# Patient Record
Sex: FEMALE | Race: WHITE | URBAN - METROPOLITAN AREA
[De-identification: names, ages, dates, MRNs, and addresses within clinical notes are randomized per-mention and may not be internally consistent; named-entity substitution may affect disease eponyms.]

---

## 2017-02-02 ENCOUNTER — TELEPHONE (OUTPATIENT)
Dept: FAMILY MEDICINE | Facility: CLINIC | Age: 37
End: 2017-02-02

## 2017-02-02 DIAGNOSIS — N80.50 ENDOMETRIOSIS OF INTESTINE: Primary | ICD-10-CM

## 2017-02-02 NOTE — TELEPHONE ENCOUNTER
Writer unsure if this would be a GI referral or OB/GYN.    Both referrals pended.    Routing to PCP.    Dr. Kang-Please review and advise.    Thank you!  RABIA IrizarryN, RN

## 2017-02-02 NOTE — TELEPHONE ENCOUNTER
Writer called patient and reviewed message per below from Dr. Kang.    Patient verbalized understanding and in agreement with plan.    Patient would like [employer] message sent to her with referral information.    [employer] message sent to patient.    RABIA IrizarryN, RN

## 2017-02-02 NOTE — TELEPHONE ENCOUNTER
Reason for Call: Request for an order or referral:    Order or referral being requested: REFERRAL    Date needed: as soon as possible    Has the patient been seen by the PCP for this problem? YES    Additional comments: PT WANTS TO SEE A SPECIALIST FOR ENDOMETRIOSIS OF THE BOWELS    Phone number Patient can be reached at:  Home number on file [phone] (home)    Best Time:  ANY TIME    Can we leave a detailed message on this number?  YES    Call taken on 2/2/2017 at 2:50 PM by Sanjana Bryson

## 2017-02-08 ENCOUNTER — OFFICE VISIT (OUTPATIENT)
Dept: OBGYN | Facility: CLINIC | Age: 37
End: 2017-02-08
Attending: OBSTETRICS & GYNECOLOGY
Payer: COMMERCIAL

## 2017-02-08 VITALS
BODY MASS INDEX: 26.47 KG/M2 | DIASTOLIC BLOOD PRESSURE: 75 MMHG | SYSTOLIC BLOOD PRESSURE: 128 MMHG | HEART RATE: 66 BPM | HEIGHT: 69 IN | WEIGHT: 178.7 LBS

## 2017-02-08 DIAGNOSIS — Z79.890 SURGICAL MENOPAUSE ON HORMONE REPLACEMENT THERAPY: ICD-10-CM

## 2017-02-08 DIAGNOSIS — Z87.42 HISTORY OF ENDOMETRIOSIS: ICD-10-CM

## 2017-02-08 DIAGNOSIS — E89.40 SURGICAL MENOPAUSE ON HORMONE REPLACEMENT THERAPY: ICD-10-CM

## 2017-02-08 DIAGNOSIS — N80.9 ENDOMETRIOSIS: Primary | ICD-10-CM

## 2017-02-08 PROBLEM — L24.4 IRRITANT CONTACT DERMATITIS DUE TO DRUG IN CONTACT WITH SKIN: Status: ACTIVE | Noted: 2017-02-08

## 2017-02-08 PROCEDURE — 99212 OFFICE O/P EST SF 10 MIN: CPT | Mod: ZF

## 2017-02-08 RX ORDER — ESTRADIOL 0.07 MG/D
1 FILM, EXTENDED RELEASE TRANSDERMAL
Qty: 12 PATCH | Refills: 3 | Status: SHIPPED | OUTPATIENT
Start: 2017-02-09 | End: 2017-10-17

## 2017-02-08 RX ORDER — LETROZOLE 2.5 MG/1
2.5 TABLET, FILM COATED ORAL DAILY
Qty: 90 TABLET | Refills: 3 | Status: SHIPPED | OUTPATIENT
Start: 2017-02-08 | End: 2018-02-13

## 2017-02-08 ASSESSMENT — ENCOUNTER SYMPTOMS
BOWEL INCONTINENCE: 0
HEARTBURN: 0
BREAST PAIN: 1
NAUSEA: 0
ARTHRALGIAS: 0
MUSCLE CRAMPS: 0
CONSTIPATION: 1
DIARRHEA: 1
HOT FLASHES: 1
STIFFNESS: 0
ABDOMINAL PAIN: 1
VOMITING: 0
DECREASED LIBIDO: 1
BREAST MASS: 0
BACK PAIN: 1
RECTAL BLEEDING: 0
NECK PAIN: 1
BLOOD IN STOOL: 0
MUSCLE WEAKNESS: 0
JOINT SWELLING: 0
JAUNDICE: 0
MYALGIAS: 1
BLOATING: 1
RECTAL PAIN: 1

## 2017-02-08 ASSESSMENT — ANXIETY QUESTIONNAIRES
7. FEELING AFRAID AS IF SOMETHING AWFUL MIGHT HAPPEN: 0 = NOT AT ALL
GAD7 TOTAL SCORE: 2
GAD7 TOTAL SCORE: 2

## 2017-02-08 NOTE — Clinical Note
"2/8/2017       RE: Ranjana Kovacs  [address]  [place] [address]     Dear Colleague,    Thank you for referring your patient, Ranjana Kovacs, to [facility] Please see a copy of my visit note below.    35yo P0 s/p ROCKY BSO for endometriosis presents for discussion of continued GI symptoms.   States same symptoms she had with endometriosis before hyst. Cramping and diarrhea.    States she has skin irritation from generic patch.     Patient Active Problem List   Diagnosis     History of endometriosis s/p TAHBSO     Constipation, unspecified constipation type     Irritant contact dermatitis due to drug in contact with skin: generic estrogen patch     Vitamin D deficiency     Past Medical History   Diagnosis Date     Endometriosis      s/p ROCKY/ BSO 2012     History of anemia      on iron in the past      Constipation      History of high cholesterol      was on lipitor controlled on diet no longer on statin, tested in [facility] 2015 wnl      Anemia      long history of anemia, often take iron     Uncomplicated asthma      inflamatory asthma     Past Surgical History   Procedure Laterality Date     Hysteroscopy diagnostic  2011     Hysterectomy, pap no longer indicated  7/20/2012     total per records reviewed      Colonoscopy  4/27/16     diverticulosis, polyp removed,      Abdomen surgery       Genitourinary surgery       Obstetric History     No data available        /75 mmHg  Pulse 66  Ht 1.746 m (5' 8.75\")  Wt 81.058 kg (178 lb 11.2 oz)  BMI 26.59 kg/m2    Exam:  Constitutional: healthy, alert and no distress  Head: Normocephalic. No masses, lesions, tenderness or abnormalities  Neck:   ENT:   Cardiovascular:   Respiratory:   Gastrointestinal:   :   Musculoskeletal: extremities normal- no gross deformities noted, gait normal and normal muscle tone  Skin: no suspicious lesions or rashes  Neurologic: Gait " normal. Reflexes normal and symmetric. Sensation grossly WNL.  Psychiatric: mentation appears normal and affect normal/bright  Hematologic/Lymphatic/Immunologic:     Reviewed history including photos from surgery and pathology.   Patient states after ROCKY BSO she was started on HRT right away. We discussed this may not have been long enough to allow complete resolution of all endometriosis lesions.     Options: continue HRT and add letrozole for 3-6 months.   Stop HRT  Stop HRT and use letrozole.     After discussion patient elects trial of letrozole with HRT.     A/P:  Endometriosis: potentially incompletely treated: letrozole trial    Surgical menopause: continue HRT    Contact dermatitis: attempt to order brand name of Vivelle patch. May require authorization.   Naz Cummings

## 2017-02-08 NOTE — PROGRESS NOTES
"35yo P0 s/p ROCKY BSO for endometriosis presents for discussion of continued GI symptoms.   States same symptoms she had with endometriosis before hyst. Cramping and diarrhea.    States she has skin irritation from generic patch.     Patient Active Problem List   Diagnosis     History of endometriosis s/p TAHBSO     Constipation, unspecified constipation type     Irritant contact dermatitis due to drug in contact with skin: generic estrogen patch     Vitamin D deficiency     Past Medical History   Diagnosis Date     Endometriosis      s/p ROCKY/ BSO 2012     History of anemia      on iron in the past      Constipation      History of high cholesterol      was on lipitor controlled on diet no longer on statin, tested in [facility] 2015 wnl      Anemia      long history of anemia, often take iron     Uncomplicated asthma      inflamatory asthma     Past Surgical History   Procedure Laterality Date     Hysteroscopy diagnostic  2011     Hysterectomy, pap no longer indicated  7/20/2012     total per records reviewed      Colonoscopy  4/27/16     diverticulosis, polyp removed,      Abdomen surgery       Genitourinary surgery       Obstetric History     No data available        /75 mmHg  Pulse 66  Ht 1.746 m (5' 8.75\")  Wt 81.058 kg (178 lb 11.2 oz)  BMI 26.59 kg/m2    Exam:  Constitutional: healthy, alert and no distress  Head: Normocephalic. No masses, lesions, tenderness or abnormalities  Neck:   ENT:   Cardiovascular:   Respiratory:   Gastrointestinal:   :   Musculoskeletal: extremities normal- no gross deformities noted, gait normal and normal muscle tone  Skin: no suspicious lesions or rashes  Neurologic: Gait normal. Reflexes normal and symmetric. Sensation grossly WNL.  Psychiatric: mentation appears normal and affect normal/bright  Hematologic/Lymphatic/Immunologic:     Reviewed history including photos from surgery and pathology.   Patient states after ROCKY BSO she was started on HRT right away. We " discussed this may not have been long enough to allow complete resolution of all endometriosis lesions.     Options: continue HRT and add letrozole for 3-6 months.   Stop HRT  Stop HRT and use letrozole.     After discussion patient elects trial of letrozole with HRT.     A/P:  Endometriosis: potentially incompletely treated: letrozole trial    Surgical menopause: continue HRT    Contact dermatitis: attempt to order brand name of Vivelle patch. May require authorization.   Naz Cummings        Answers for HPI/ROS submitted by the patient on 2/8/2017   RAO 7 TOTAL SCORE: 2  PHQ-2 Depressed: Not at all, Not at all  PHQ-2 Score: 0  General Symptoms: No  Skin Symptoms: No  HENT Symptoms: No  EYE SYMPTOMS: No  HEART SYMPTOMS: No  LUNG SYMPTOMS: No  INTESTINAL SYMPTOMS: Yes  URINARY SYMPTOMS: No  GYNECOLOGIC SYMPTOMS: Yes  BREAST SYMPTOMS: Yes  SKELETAL SYMPTOMS: Yes  BLOOD SYMPTOMS: No  NERVOUS SYSTEM SYMPTOMS: No  MENTAL HEALTH SYMPTOMS: No  Heart burn or indigestion: No  Nausea: No  Vomiting: No  Abdominal pain: Yes  Bloating: Yes  Constipation: Yes  Diarrhea: Yes  Blood in stool: No  Black stools: No  Rectal or Anal pain: Yes  Fecal incontinence: No  Rectal bleeding: No  Yellowing of skin or eyes: No  Vomit with blood: No  Change in stools: Yes  Hemorrhoids: Yes  Back pain: Yes  Muscle aches: Yes  Neck pain: Yes  Swollen joints: No  Joint pain: No  Bone pain: No  Muscle cramps: No  Muscle weakness: No  Joint stiffness: No  Bone fracture: No  Bleeding or spotting between periods: No  Heavy or painful periods: No  Irregular periods: No  Vaginal discharge: No  Hot flashes: Yes  Vaginal dryness: No  Genital ulcers: No  Reduced libido: Yes  Painful intercourse: No  Difficulty with sexual arousal: Yes  Post-menopausal bleeding: No  Discharge: No  Lumps: No  Pain: Yes  Nipple retraction: No

## 2017-02-09 ASSESSMENT — ANXIETY QUESTIONNAIRES: GAD7 TOTAL SCORE: 2

## 2017-05-15 ENCOUNTER — MYC MEDICAL ADVICE (OUTPATIENT)
Dept: FAMILY MEDICINE | Facility: CLINIC | Age: 37
End: 2017-05-15

## 2017-05-15 DIAGNOSIS — Z87.42 HISTORY OF ENDOMETRIOSIS: ICD-10-CM

## 2017-05-15 DIAGNOSIS — Z79.890 SURGICAL MENOPAUSE ON HORMONE REPLACEMENT THERAPY: ICD-10-CM

## 2017-05-15 DIAGNOSIS — E89.40 SURGICAL MENOPAUSE ON HORMONE REPLACEMENT THERAPY: ICD-10-CM

## 2017-05-18 NOTE — TELEPHONE ENCOUNTER
The hormones were given by ob in feb . Please direct med refills to them thanks as they are managing her endometriosis.

## 2017-05-18 NOTE — TELEPHONE ENCOUNTER
Routing refill request to provider for review/approval because:  -Estradiol also on active medication list, defer to provider as writer unsure if safe for patient to take both progesterone and estradiol.  -Patient also overdue for annual office visit.    Writer responded as per below.    Dr. Kang-Please sign if agree.    Thank you!  RABIA IrizarryN, RN          progesterone (PROMETRIUM) 100 MG capsule      Last Written Prescription Date: 5/10/16  Last Fill Quantity: 30, # refills: 11  Last Office Visit with [facility] or [facility] prescribing provider: 3/15/16       BP Readings from Last 3 Encounters:   02/08/17 128/75   05/11/16 123/68   03/25/16 128/66     Date of last Breast Exam: Unknown per chart review

## 2017-06-06 ENCOUNTER — OFFICE VISIT (OUTPATIENT)
Dept: FAMILY MEDICINE | Facility: CLINIC | Age: 37
End: 2017-06-06
Payer: COMMERCIAL

## 2017-06-06 VITALS
OXYGEN SATURATION: 98 % | TEMPERATURE: 98.3 F | WEIGHT: 177.5 LBS | HEART RATE: 62 BPM | DIASTOLIC BLOOD PRESSURE: 74 MMHG | BODY MASS INDEX: 26.29 KG/M2 | SYSTOLIC BLOOD PRESSURE: 130 MMHG | RESPIRATION RATE: 16 BRPM | HEIGHT: 69 IN

## 2017-06-06 DIAGNOSIS — E55.9 VITAMIN D DEFICIENCY: ICD-10-CM

## 2017-06-06 DIAGNOSIS — M89.8X1 PAIN OF RIGHT SCAPULA: ICD-10-CM

## 2017-06-06 DIAGNOSIS — K58.1 IRRITABLE BOWEL SYNDROME WITH CONSTIPATION: ICD-10-CM

## 2017-06-06 DIAGNOSIS — M79.672 LEFT FOOT PAIN: ICD-10-CM

## 2017-06-06 DIAGNOSIS — Z88.0 PENICILLIN ALLERGY: ICD-10-CM

## 2017-06-06 DIAGNOSIS — K64.4 EXTERNAL HEMORRHOIDS: ICD-10-CM

## 2017-06-06 DIAGNOSIS — Z87.42 HISTORY OF ENDOMETRIOSIS: ICD-10-CM

## 2017-06-06 DIAGNOSIS — Z00.00 ROUTINE GENERAL MEDICAL EXAMINATION AT A HEALTH CARE FACILITY: Primary | ICD-10-CM

## 2017-06-06 DIAGNOSIS — Z13.6 ENCOUNTER FOR SCREENING FOR CARDIOVASCULAR DISORDERS: ICD-10-CM

## 2017-06-06 DIAGNOSIS — Z13.0 SCREENING, ANEMIA, DEFICIENCY, IRON: ICD-10-CM

## 2017-06-06 DIAGNOSIS — Z13.1 SCREENING FOR DIABETES MELLITUS: ICD-10-CM

## 2017-06-06 DIAGNOSIS — S46.811A STRAIN OF TRAPEZIUS MUSCLE, RIGHT, INITIAL ENCOUNTER: ICD-10-CM

## 2017-06-06 DIAGNOSIS — M54.2 NECK PAIN: ICD-10-CM

## 2017-06-06 DIAGNOSIS — Z23 NEED FOR TDAP VACCINATION: ICD-10-CM

## 2017-06-06 LAB
BASOPHILS # BLD AUTO: 0 10E9/L (ref 0–0.2)
BASOPHILS NFR BLD AUTO: 0.3 %
DIFFERENTIAL METHOD BLD: NORMAL
EOSINOPHIL # BLD AUTO: 0.1 10E9/L (ref 0–0.7)
EOSINOPHIL NFR BLD AUTO: 1.4 %
ERYTHROCYTE [DISTWIDTH] IN BLOOD BY AUTOMATED COUNT: 13 % (ref 10–15)
HCT VFR BLD AUTO: 40.3 % (ref 35–47)
HGB BLD-MCNC: 13.3 G/DL (ref 11.7–15.7)
LYMPHOCYTES # BLD AUTO: 1.8 10E9/L (ref 0.8–5.3)
LYMPHOCYTES NFR BLD AUTO: 28.3 %
MCH RBC QN AUTO: 31.1 PG (ref 26.5–33)
MCHC RBC AUTO-ENTMCNC: 33 G/DL (ref 31.5–36.5)
MCV RBC AUTO: 94 FL (ref 78–100)
MONOCYTES # BLD AUTO: 0.6 10E9/L (ref 0–1.3)
MONOCYTES NFR BLD AUTO: 9.3 %
NEUTROPHILS # BLD AUTO: 3.9 10E9/L (ref 1.6–8.3)
NEUTROPHILS NFR BLD AUTO: 60.7 %
PLATELET # BLD AUTO: 203 10E9/L (ref 150–450)
RBC # BLD AUTO: 4.27 10E12/L (ref 3.8–5.2)
WBC # BLD AUTO: 6.5 10E9/L (ref 4–11)

## 2017-06-06 PROCEDURE — 99395 PREV VISIT EST AGE 18-39: CPT | Mod: 25 | Performed by: FAMILY MEDICINE

## 2017-06-06 PROCEDURE — 80061 LIPID PANEL: CPT | Performed by: FAMILY MEDICINE

## 2017-06-06 PROCEDURE — 90471 IMMUNIZATION ADMIN: CPT | Performed by: FAMILY MEDICINE

## 2017-06-06 PROCEDURE — 36415 COLL VENOUS BLD VENIPUNCTURE: CPT | Performed by: FAMILY MEDICINE

## 2017-06-06 PROCEDURE — 80050 GENERAL HEALTH PANEL: CPT | Performed by: FAMILY MEDICINE

## 2017-06-06 PROCEDURE — 90715 TDAP VACCINE 7 YRS/> IM: CPT | Performed by: FAMILY MEDICINE

## 2017-06-06 PROCEDURE — 99213 OFFICE O/P EST LOW 20 MIN: CPT | Mod: 25 | Performed by: FAMILY MEDICINE

## 2017-06-06 PROCEDURE — 82306 VITAMIN D 25 HYDROXY: CPT | Performed by: FAMILY MEDICINE

## 2017-06-06 RX ORDER — CYCLOBENZAPRINE HCL 5 MG
5 TABLET ORAL AT BEDTIME
Qty: 14 TABLET | Refills: 0 | Status: SHIPPED | OUTPATIENT
Start: 2017-06-06 | End: 2017-06-20

## 2017-06-06 NOTE — NURSING NOTE
Screening Questionnaire for Pediatric Immunization     Is the child sick today?   No    Does the child have allergies to medications, food a vaccine component, or latex?   Yes    Has the child had a serious reaction to a vaccine in the past?   No    Has the child had a health problem with lung, heart, kidney or metabolic disease (e.g., diabetes), asthma, or a blood disorder?  Is he/she on long-term aspirin therapy?   No    If the child to be vaccinated is 2 through 4 years of age, has a healthcare provider told you that the child had wheezing or asthma in the  past 12 months?   No   If your child is a baby, have you ever been told he or she has had intussusception ?   No    Has the child, sibling or parent had a seizure, has the child had brain or other nervous system problems?   No    Does the child have cancer, leukemia, AIDS, or any immune system          problem?   No    In the past 3 months, has the child taken medications that affect the immune system such as prednisone, other steroids, or anticancer drugs; drugs for the treatment of rheumatoid arthritis, Crohn s disease, or psoriasis; or had radiation treatments?   No   In the past year, has the child received a transfusion of blood or blood products, or been given immune (gamma) globulin or an antiviral drug?   No    Is the child/teen pregnant or is there a chance that she could become         pregnant during the next month?   No    Has the child received any vaccinations in the past 4 weeks?   No      Immunization questionnaire was positive for at least one answer.  Notified Dr. Rosario Kang MD and Dr. Jorge Luis MD gave the go ahead to give the patient her vaccine Tdap.      [place] doesn't apply on this patient    [place] self[place]screening form given to patient.    Per orders of Dr. Kang, injection of Tdap  given by Shweta Cardoza. Patient instructed to remain in clinic for 20 minutes afterwards, and to report any adverse reaction to me  "immediately.    Screening performed by Shweta Cardoza on 6/6/2017 at 5:22 PM.    Chief Complaint   Patient presents with     Physical     Initial /74  Pulse 62  Temp 98.3  F (36.8  C) (Oral)  Resp 16  Ht 5' 8.75\" (1.746 m)  Wt 177 lb 8 oz (80.5 kg)  SpO2 98%  BMI 26.4 kg/m2 Estimated body mass index is 26.4 kg/(m^2) as calculated from the following:    Height as of this encounter: 5' 8.75\" (1.746 m).    Weight as of this encounter: 177 lb 8 oz (80.5 kg)..  BP completed using cuff size: kavin Haas MA   "

## 2017-06-06 NOTE — MR AVS SNAPSHOT
After Visit Summary   6/6/2017    Ranjana Kovacs    MRN: [id number]           Patient Information     Date Of Birth          1980        Visit Information        Provider Department      6/6/2017 4:00 PM Rosario Kang MD [facility]        Today's Diagnoses     Routine general medical examination at a health care facility    -  1    History of endometriosis s/p TAHBSO        Vitamin D deficiency        Irritable bowel syndrome with constipation        Screening, anemia, deficiency, iron        Screening for diabetes mellitus        Encounter for screening for cardiovascular disorders        Need for Tdap vaccination        External hemorrhoids        Pain of right scapula        Neck pain        Penicillin allergy        Strain of trapezius muscle, right, initial encounter        Left foot pain          Care Instructions    tdap today   Labs today   No need for xray  follo wup gyn for hormones, call their clinic for refills  Follo wup Gi as needed for constipation irritable bowel portion  Colorectal referral for hemorrhoids  Suspect right trapezius strain   No need for xrays or mri yet   ibuprofen 600 mg three times a day with food 5 days  Flexeril 5 mg bedtime 14 days   Can make you drowsy   Ice then heat to upper back and left foot   Referral made to ortho for shoulder ,   Can decide about Physical therapy after that   Soak foot in warm then ice water every 5 min for 20 min daily   See allergist regarding Penicillin allergy         Preventive Health Recommendations  Female Ages 26 - 39  Yearly exam:   See your health care provider every year in order to    Review health changes.     Discuss preventive care.      Review your medicines if you your doctor has prescribed any.    Until age 30: Get a Pap test every three years (more often if you have had an abnormal result).    After age 30: Talk to your doctor about whether you should have a Pap test every 3 years or have a Pap test  with HPV screening every 5 years.   You do not need a Pap test if your uterus was removed (hysterectomy) and you have not had cancer.  You should be tested each year for STDs (sexually transmitted diseases), if you're at risk.   Talk to your provider about how often to have your cholesterol checked.  If you are at risk for diabetes, you should have a diabetes test (fasting glucose).  Shots: Get a flu shot each year. Get a tetanus shot every 10 years.   Nutrition:     Eat at least 5 servings of fruits and vegetables each day.    Eat whole-grain bread, whole-wheat pasta and brown rice instead of white grains and rice.    Talk to your provider about Calcium and Vitamin D.     Lifestyle    Exercise at least 150 minutes a week (30 minutes a day, 5 days of the week). This will help you control your weight and prevent disease.    Limit alcohol to one drink per day.    No smoking.     Wear sunscreen to prevent skin cancer.    See your dentist every six months for an exam and cleaning.            Follow-ups after your visit        Additional Services     ALLERGY/ASTHMA ADULT REFERRAL       Your provider has referred you to: [facility] - [phone]  [email or web address]  FH[facility]: [facility] [place] [phone]   [email or web address]  [place] [phone]   [email or web address]    Please be aware that coverage of these services is subject to the terms and limitations of your health insurance plan.  Call member services at your health plan with any benefit or coverage questions.      Please bring the following with you to your appointment:    (1) Any X-Rays, CTs or MRIs which have been performed.  Contact the facility where they were done to arrange for  prior to your scheduled appointment.    (2) List of current medications  (3) This referral request   (4) Any documents/labs given to you for this referral            COLORECTAL  SURGERY REFERRAL       Your provider has referred you to: [facility]: [facility] [place] [phone]   [email or web address]  [facility]: [facility] [phone]   [email or web address]  [facility]: [facility] [phone]   [email or web address]  [place] [phone]   [email or web address]    Referral Reason(s): Hemorrhoids  Special Concerns: None  This referral is: Elective (week +)  It is OK to leave a message on patient's voicemail.    Please be aware that coverage of these services is subject to the terms and limitations of your health insurance plan.  Call member services at your health plan with any benefit or coverage questions.      Please bring the following with you to your appointment:    (1) Any X-Rays, CTs or MRIs which have been performed.  Contact the facility where they were done to arrange for  prior to your scheduled appointment.    (2) List of current medications  (3) This referral request   (4) Any documents/labs given to you for this referral            ORTHO  REFERRAL       [facility] is referring you to the Orthopedic  Services at [facility].       The  Representative will assist you in the coordination of your Orthopedic and Musculoskeletal Care as prescribed by your physician.    The  Representative will call you within 1 business day to help schedule your appointment, or you may contact the  Representative at:    All areas ~ [phone]     Type of Referral : Non Surgical  Spine: Cervical / Thoracic: Cervical / Thoracic Spine Surgeon        Timeframe requested: 1 - 2 days    Coverage of these services is subject to the terms and limitations of your health insurance plan.  Please call member services at your health plan with any  benefit or coverage questions.      If X-rays, CT or MRI's have been performed, please contact the facility where they were done to arrange for , prior to your scheduled appointment.  Please bring this referral request to your appointment and present it to your specialist.                  Your next 10 appointments already scheduled     Jun 22, 2017  3:30 PM CDT   Return Visit with Naz Cummings MD   [facility] ([facility])    [facility] [address]  [address]  [facility] [address] [phone]              Who to contact     If you have questions or need follow up information about today's clinic visit or your schedule please contact [facility] directly at [phone].  Normal or non-critical lab and imaging results will be communicated to you by MyChart, letter or phone within 4 business days after the clinic has received the results. If you do not hear from us within 7 days, please contact the clinic through MyChart or phone. If you have a critical or abnormal lab result, we will notify you by phone as soon as possible.  Submit refill requests through [employer] or call your pharmacy and they will forward the refill request to us. Please allow 3 business days for your refill to be completed.          Additional Information About Your Visit        [employer] Information     [employer] gives you secure access to your electronic health record. If you see a primary care provider, you can also send messages to your care team and make appointments. If you have questions, please call your primary care clinic.  If you do not have a primary care provider, please call [phone] and they will assist you.        Care EveryWhere ID     This is your Care EveryWhere ID. This could be used by other organizations to access your [place] medical records  [id number]        Your Vitals Were     Pulse Temperature Respirations Height Pulse  "Oximetry BMI (Body Mass Index)    62 98.3  F (36.8  C) (Oral) 16 5' 8.75\" (1.746 m) 98% 26.4 kg/m2       Blood Pressure from Last 3 Encounters:   06/06/17 130/74   02/08/17 128/75   05/11/16 123/68    Weight from Last 3 Encounters:   06/06/17 177 lb 8 oz (80.5 kg)   02/08/17 178 lb 11.2 oz (81.1 kg)   05/11/16 167 lb 14.4 oz (76.2 kg)              We Performed the Following     ALLERGY/ASTHMA ADULT REFERRAL     CBC with platelets differential     COLORECTAL SURGERY REFERRAL     Comprehensive metabolic panel     Lipid panel reflex to direct LDL     ORTHO  REFERRAL     TDAP VACCINE (ADACEL)     TSH with free T4 reflex     VACCINE ADMINISTRATION, INITIAL     Vitamin D Deficiency          Today's Medication Changes          These changes are accurate as of: 6/6/17  5:17 PM.  If you have any questions, ask your nurse or doctor.               Start taking these medicines.        Dose/Directions    cyclobenzaprine 5 MG tablet   Commonly known as:  FLEXERIL   Used for:  Pain of right scapula, Neck pain   Started by:  Rosario Kang MD        Dose:  5 mg   Take 1 tablet (5 mg) by mouth At Bedtime for 14 days   Quantity:  14 tablet   Refills:  0            Where to get your medicines      These medications were sent to [facility] [address] [place] [address] AT [address] [address]     Phone:  [phone]     cyclobenzaprine 5 MG tablet                Primary Care Provider Office Phone # Fax #    Rosario Kang [phone]       [facility] [address] [address]        Thank you!     Thank you for choosing [facility]  for your care. Our goal is always to provide you with excellent care. Hearing back from our patients is one way we can continue to improve our services. Please take a few minutes to complete the written survey that you may receive in the mail after your visit with us. Thank you!             Your " Updated Medication List - Protect others around you: Learn how to safely use, store and throw away your medicines at www.disposemymeds.org.          This list is accurate as of: 6/6/17  5:17 PM.  Always use your most recent med list.                   Brand Name Dispense Instructions for use    cyclobenzaprine 5 MG tablet    FLEXERIL    14 tablet    Take 1 tablet (5 mg) by mouth At Bedtime for 14 days       dicyclomine 10 MG capsule    BENTYL    80 capsule    Take 1-2 capsules (10-20 mg) by mouth 2 times daily as needed Do not use at same time as hyoscyamine       estradiol 0.075 MG/24HR BIW patch    VIVELLE-DOT    12 patch    Place 1 patch onto the skin twice a week       hyoscyamine 0.125 MG sublingual tablet    LEVSIN/SL    60 tablet    Place 1 tablet (0.125 mg) under the tongue every 4 hours as needed for cramping       letrozole 2.5 MG tablet    FEMARA    90 tablet    Take 1 tablet (2.5 mg) by mouth daily       polyethylene glycol Packet    MIRALAX/GLYCOLAX     Take 1 packet by mouth 2 times daily       progesterone 100 MG capsule    PROMETRIUM    30 capsule    Take 1 capsule (100 mg) by mouth daily       vitamin D3 2000 UNITS Caps

## 2017-06-06 NOTE — PATIENT INSTRUCTIONS
tdap today   Labs today   No need for xray  follo wup gyn for hormones, call their clinic for refills  Follo wup Gi as needed for constipation irritable bowel portion  Colorectal referral for hemorrhoids  Suspect right trapezius strain   No need for xrays or mri yet   ibuprofen 600 mg three times a day with food 5 days  Flexeril 5 mg bedtime 14 days   Can make you drowsy   Ice then heat to upper back and left foot   Referral made to ortho for shoulder ,   Can decide about Physical therapy after that   Soak foot in warm then ice water every 5 min for 20 min daily   See allergist regarding Penicillin allergy         Preventive Health Recommendations  Female Ages 26 - 39  Yearly exam:   See your health care provider every year in order to    Review health changes.     Discuss preventive care.      Review your medicines if you your doctor has prescribed any.    Until age 30: Get a Pap test every three years (more often if you have had an abnormal result).    After age 30: Talk to your doctor about whether you should have a Pap test every 3 years or have a Pap test with HPV screening every 5 years.   You do not need a Pap test if your uterus was removed (hysterectomy) and you have not had cancer.  You should be tested each year for STDs (sexually transmitted diseases), if you're at risk.   Talk to your provider about how often to have your cholesterol checked.  If you are at risk for diabetes, you should have a diabetes test (fasting glucose).  Shots: Get a flu shot each year. Get a tetanus shot every 10 years.   Nutrition:     Eat at least 5 servings of fruits and vegetables each day.    Eat whole-grain bread, whole-wheat pasta and brown rice instead of white grains and rice.    Talk to your provider about Calcium and Vitamin D.     Lifestyle    Exercise at least 150 minutes a week (30 minutes a day, 5 days of the week). This will help you control your weight and prevent disease.    Limit alcohol to one drink per  day.    No smoking.     Wear sunscreen to prevent skin cancer.    See your dentist every six months for an exam and cleaning.

## 2017-06-06 NOTE — PROGRESS NOTES
Keith Ms. Kovacs,  Your results came back and are within acceptable limits. -Normal red blood cell (hgb) levels, normal white blood cell count and normal platelet levels.. If you have any further concerns please do not hesitate to contact us by message, phone or making an appointment.  Have a good day   Dr Jorge Luis WARNER

## 2017-06-07 LAB
ALBUMIN SERPL-MCNC: 4 G/DL (ref 3.4–5)
ALP SERPL-CCNC: 91 U/L (ref 40–150)
ALT SERPL W P-5'-P-CCNC: 28 U/L (ref 0–50)
ANION GAP SERPL CALCULATED.3IONS-SCNC: 6 MMOL/L (ref 3–14)
AST SERPL W P-5'-P-CCNC: 23 U/L (ref 0–45)
BILIRUB SERPL-MCNC: 0.3 MG/DL (ref 0.2–1.3)
BUN SERPL-MCNC: 15 MG/DL (ref 7–30)
CALCIUM SERPL-MCNC: 8.8 MG/DL (ref 8.5–10.1)
CHLORIDE SERPL-SCNC: 109 MMOL/L (ref 94–109)
CHOLEST SERPL-MCNC: 213 MG/DL
CO2 SERPL-SCNC: 26 MMOL/L (ref 20–32)
CREAT SERPL-MCNC: 0.81 MG/DL (ref 0.52–1.04)
DEPRECATED CALCIDIOL+CALCIFEROL SERPL-MC: 46 UG/L (ref 20–75)
GFR SERPL CREATININE-BSD FRML MDRD: 80 ML/MIN/1.7M2
GLUCOSE SERPL-MCNC: 81 MG/DL (ref 70–99)
HDLC SERPL-MCNC: 60 MG/DL
LDLC SERPL CALC-MCNC: 132 MG/DL
NONHDLC SERPL-MCNC: 153 MG/DL
POTASSIUM SERPL-SCNC: 4 MMOL/L (ref 3.4–5.3)
PROT SERPL-MCNC: 7.6 G/DL (ref 6.8–8.8)
SODIUM SERPL-SCNC: 141 MMOL/L (ref 133–144)
TRIGL SERPL-MCNC: 107 MG/DL
TSH SERPL DL<=0.005 MIU/L-ACNC: 0.87 MU/L (ref 0.4–4)

## 2017-06-07 NOTE — PROGRESS NOTES
Keith Ms. Kovacs,  Your results came back and are within acceptable limits. -Liver and gallbladder tests are normal. (ALT,AST, Alk phos, bilirubin), kidney function is normal (Cr, GFR), Sodium is normal, Potassium is normal, Calcium is normal, Glucose is normal (diabetes screening test).   -Cholesterol levels (LDL,HDL, Triglycerides) are normal.  ADVISE: rechecking in 1 year.  -TSH (thyroid stimulating hormone) level is normal    If you have any further concerns please do not hesitate to contact us by message, phone or making an appointment.  Have a good day   Dr Jorge Luis WARNER

## 2017-06-07 NOTE — PROGRESS NOTES
SUBJECTIVE:     CC: Ranjana Kovacs is an 37 year old woman who presents for preventive health visit.     Healthy Habits:  Answers for HPI/ROS submitted by the patient on 6/6/2017   Annual Exam:  Getting at least 3 servings of Calcium per day:: Yes  Bi-annual eye exam:: Yes  Dental care twice a year:: Yes  Sleep apnea or symptoms of sleep apnea:: None  Diet:: Regular (no restrictions)  Frequency of exercise:: 2-3 days/week  Taking medications regularly:: Yes  Medication side effects:: None  Additional concerns today:: YES  PHQ-2 Score: 0  Duration of exercise:: 45-60 minutes    Additional concerns     Endometriosis:   Prior hysterectomy and BSO, on HRt started maybe too soon after surgery in [facility]. Started having abdominal cramping, seen by GI and treated fr IBS has prn dicyclomine and hyoscamine at home seen by gyn in feb and continue don hrt but letrozole added and feels that has given her best benefit no longer having abdominal cramping or pain and only occasional constipation. Wonders if needs to get hrt and letrozole refilled by gyn or by me, will be running out of her hormones before goes back to see gyn     Issues with hemorrhoids  Had in past . Flares up and goes away usually just painful. Rarely bleeds. Saw someone years ago.Last 6 month to 1 year more of a problem.     Back and shoulder pain  Right posterior shoulder hurting ( points to upper right back ). Two years ago when hurting saw Pt. Reports its the same shoulder but different kind of pain. Started new pain about 4 months ago subtle and getting worse. Tried doing previously taught exercises but didn't help. Has had No fall or injury. Is Right handed. Hurts with extending arm backwards at shoulder. Has to draw a lot for work. If sitting is fine. Usually discomfort If moving or stretching back makes it hurt. No swelling redness or warmth. No tingling or weakness in arms    Also reports Upper back nerve like pain not sure if related to  shoulder pain mentioned above. Its midline upper neck . No falls or injuries  Does not radiate anywhere. Occurs only if bending or straining for any reason. This weekend moved and was picking up boxes and  could feel it then, its like a tingling pain that she calls a nerve pain that last few seconds when it happens and may occur couple times when occurs. .     Left foot pain over top of left foot   End of day hurt really bad. No twisting   Occurred one day work up not sure how long   When first wakes up it hurts a bit but can walk on it This week on feet a lot so hurts a bit more.      test to allergy to PCN, age 5 or 6 had high fevers,given PCN and reported had a rash , since then never given PCN and wonders if can be tested as friend recently had a test and found not allergic to it so would like to use PCN if not allergic     Today's PHQ-2 Score:   PHQ-2 ( 1999 Pf[employer]) 6/6/2017 2/8/2017   Q1: Little interest or pleasure in doing things 0 -   Q2: Feeling down, depressed or hopeless 0 -   PHQ-2 Score 0 -   Q1: Little interest or pleasure in doing things Not at all Not at all   Q2: Feeling down, depressed or hopeless Not at all Not at all   PHQ-2 Score 0 0       Abuse: Current or Past(Physical, Sexual or Emotional)- No  Do you feel safe in your environment - Yes    Social History   Substance Use Topics     Smoking status: Former Smoker     Packs/day: 0.50     Years: 2.00     Types: Cigarettes     Start date: 1/1/1996     Quit date: 1/1/1999     Smokeless tobacco: Not on file     Alcohol use 0.0 oz/week      Comment: occasional      The patient does not drink >3 drinks per day nor >7 drinks per week.    No results for input(S): CHOL, HDL, LDL, TRIG, CHOLHDLRATIO, NHDL in the last 89579 hours.    Reviewed orders with patient.  Reviewed health maintenance and updated orders accordingly - Yes    Mammo Decision Support:  Mammogram not appropriate for this patient based on age.    Pertinent mammograms are reviewed under  the imaging tab.  History of abnormal Pap smear: Status post benign hysterectomy. Health Maintenance and Surgical History updated.    Reviewed and updated as needed this visit by clinical staff  Tobacco  Allergies  Meds  Med Hx  Surg Hx  Fam Hx  Soc Hx        Reviewed and updated as needed this visit by Provider  Meds        Past Medical History:   Diagnosis Date     Anemia     long history of anemia, often take iron     Constipation      Endometriosis     s/p ROCKY/ BSO 2012     History of anemia     on iron in the past      History of high cholesterol     was on lipitor controlled on diet no longer on statin, tested in [facility] 2015 wnl      Uncomplicated asthma     inflamatory asthma      Past Surgical History:   Procedure Laterality Date     ABDOMEN SURGERY       COLONOSCOPY  4/27/16    diverticulosis, polyp removed,      GENITOURINARY SURGERY       HYSTERECTOMY, PAP NO LONGER INDICATED  7/20/2012    total per records reviewed      HYSTEROSCOPY DIAGNOSTIC  2011     Obstetric History     No data available          ROS:  C: NEGATIVE for fever, chills, change in weight  I: NEGATIVE for worrisome rashes, moles or lesions  E: NEGATIVE for vision changes or irritation  ENT: NEGATIVE for ear, mouth and throat problems  R: NEGATIVE for significant cough or SOB  B: NEGATIVE for masses, tenderness or discharge  CV: NEGATIVE for chest pain, palpitations or peripheral edema  GI: NEGATIVE for nausea, abdominal pain, heartburn, or change in bowel habits, has IBS constipation predominant   : NEGATIVE for unusual urinary or vaginal symptoms. No vaginal bleeding.  M: NEGATIVE for significant arthralgias or myalgia except as noted above   N: NEGATIVE for weakness, dizziness or paresthesias  P: NEGATIVE for changes in mood or affect     Problem list, Medication list, Allergies, and Medical/Social/Surgical histories reviewed in [facility] and updated as appropriate.  Labs reviewed in EPIC  BP Readings from Last 3 Encounters:    06/06/17 130/74   02/08/17 128/75   05/11/16 123/68    Wt Readings from Last 3 Encounters:   06/06/17 177 lb 8 oz (80.5 kg)   02/08/17 178 lb 11.2 oz (81.1 kg)   05/11/16 167 lb 14.4 oz (76.2 kg)                  Patient Active Problem List   Diagnosis     History of endometriosis s/p TAHBSO     Constipation, unspecified constipation type     Irritant contact dermatitis due to drug in contact with skin: generic estrogen patch     Vitamin D deficiency     Past Surgical History:   Procedure Laterality Date     ABDOMEN SURGERY       COLONOSCOPY  4/27/16    diverticulosis, polyp removed,      GENITOURINARY SURGERY       HYSTERECTOMY, PAP NO LONGER INDICATED  7/20/2012    total per records reviewed      HYSTEROSCOPY DIAGNOSTIC  2011       Social History   Substance Use Topics     Smoking status: Former Smoker     Packs/day: 0.50     Years: 2.00     Types: Cigarettes     Start date: 1/1/1996     Quit date: 1/1/1999     Smokeless tobacco: Not on file     Alcohol use 0.0 oz/week      Comment: occasional      Family History   Problem Relation Age of Onset     Medical History Unknown Mother      ? gastric issues not specified     Other - See Comments Mother      diverticulitis     Coronary Artery Disease Father      MI     Hypertension Father      Hyperlipidemia Father      Lactose Intolerance Brother      and father     Colon Cancer No family hx of          Current Outpatient Prescriptions   Medication Sig Dispense Refill     cyclobenzaprine (FLEXERIL) 5 MG tablet Take 1 tablet (5 mg) by mouth At Bedtime for 14 days 14 tablet 0     progesterone (PROMETRIUM) 100 MG capsule Take 1 capsule (100 mg) by mouth daily 30 capsule 0     letrozole (FEMARA) 2.5 MG tablet Take 1 tablet (2.5 mg) by mouth daily 90 tablet 3     estradiol (VIVELLE-DOT) 0.075 MG/24HR BIW patch Place 1 patch onto the skin twice a week 12 patch 3     Cholecalciferol (VITAMIN D3) 2000 UNITS CAPS        dicyclomine (BENTYL) 10 MG capsule Take 1-2 capsules  "(10-20 mg) by mouth 2 times daily as needed Do not use at same time as hyoscyamine 80 capsule 3     hyoscyamine (LEVSIN/SL) 0.125 MG SL tablet Place 1 tablet (0.125 mg) under the tongue every 4 hours as needed for cramping 60 tablet 2     polyethylene glycol (MIRALAX/GLYCOLAX) packet Take 1 packet by mouth 2 times daily       Allergies   Allergen Reactions     Penicillins      Very high fevers     Recent Labs   Lab Test  06/06/17   1720  03/01/16   1110   LDL  132*   --    HDL  60   --    TRIG  107   --    ALT  28  30   CR  0.81  0.73   GFRESTIMATED  80  >90  Non  GFR Calc     GFRESTBLACK  >90   GFR Calc    >90   GFR Calc     POTASSIUM  4.0  3.9   TSH  0.87  0.87      OBJECTIVE:     /74  Pulse 62  Temp 98.3  F (36.8  C) (Oral)  Resp 16  Ht 5' 8.75\" (1.746 m)  Wt 177 lb 8 oz (80.5 kg)  SpO2 98%  BMI 26.4 kg/m2  EXAM:  GENERAL: healthy, alert and no distress  EYES: Eyes grossly normal to inspection, PERRL and conjunctivae and sclerae normal  HENT: ear canals and TM's normal, nose and mouth without ulcers or lesions  NECK: no adenopathy, no asymmetry, masses, or scars and thyroid normal to palpation  RESP: lungs clear to auscultation - no rales, rhonchi or wheezes  BREAST: normal without masses, tenderness or nipple discharge and no palpable axillary masses or adenopathy  CV: regular rate and rhythm, normal S1 S2, no S3 or S4, no murmur, click or rub, no peripheral edema and peripheral pulses strong  ABDOMEN: soft, non tender, no hepatosplenomegaly, no masses and bowel sounds normal  RECTAL: normal sphincter tone, no rectal masses and tiny inflamed hemmorrhoid 12 oclock position  hemorrhoid  MS: full range of motion C spine and Tspine, no pint tenderness, full range of motion right shoulder no painful arc,distal CMS intact ,  normal. discomfort with extension of shoulder and on palpation in right trapezius area. Left foot non tender to touch, full range " of motion ankle and foot joints, dorsalis pedis intact, no edema or swelling or bruising or warmth noted. no gross musculoskeletal defects noted, no edema  SKIN: no suspicious lesions or rashes  NEURO: Normal strength and tone, mentation intact and speech normal  PSYCH: mentation appears normal, affect normal/bright    ASSESSMENT/PLAN:     1. Routine general medical examination at a health care facility  Hx of endometriosis S/P ROCKY & BSO on HRT and on letrazole since 2/2017 since seen Gyn in [place] , vit d deficiency on vit d , IBS with constipation on miralax, bentyl and hyoscamine prn seen by GI , irritant contact dermatitis from estrogen patch, seen by me in 3/2016 , seen by GI 5/11/16 given trial of dicyclomine and peppermint ( [place]bg[place]). Seen by gyn 2/8/17 for endometriosis symptoms suspected HRt started too soon after surgery. After discussion continued on HRT with Prometrium and Vivelle with Femara added. [place]  review shows received oxycodone 5 mg #1 5 by DDS . here for a physical. Here for annual physical with additional concerns. Breast exam normal. No pelvic pap indicated. Consider Tdap. Labs today. Continue care with gyn and GI as indicated by them.  Follow up gyn for hormones, call their clinic for refills. No need for xray based on exam today. Colorectal referral for hemorrhoids. Suspect right trapezius strain. No need for xray's or mri yet unless not better with conservative management. ibuprofen 600 mg three times a day with food 5 days. Flexeril 5 mg bedtime 14 days. Can cause drowsiness. Caution when driving or operating machinery. Ice then heat to upper back and left foot. Referral made to ortho for shoulder , upper back and left foot. Can decide about Physical therapy after that. Soak foot in warm then ice water every 5 min for 20 min daily. if not better could do an xray. See allergist regarding Penicillin allergy   - CBC with platelets differential  - Comprehensive metabolic panel  - Lipid  panel reflex to direct LDL  - VACCINE ADMINISTRATION, INITIAL  - TDAP VACCINE (ADACEL)    2. History of endometriosis S/P TAHBSO  On HRT plus letrozole doing well , continue care with Gyn  - CBC with platelets differential  - Comprehensive metabolic panel  - TSH with free T4 reflex    3. Vitamin D deficiency  - Vitamin D Deficiency    4. Irritable bowel syndrome with constipation  Improved, constipation still an issue at times.  - CBC with platelets differential  - Comprehensive metabolic panel  - TSH with free T4 reflex    5. Screening, anemia, deficiency, iron  - CBC with platelets differential    6. Screening for diabetes mellitus  - Comprehensive metabolic panel    7. Encounter for screening for cardiovascular disorder  - Lipid panel reflex to direct LDL    8. Need for Tdap vaccination  - VACCINE ADMINISTRATION, INITIAL  - TDAP VACCINE (ADACEL)    9. External hemorrhoids  Small but painful and inflamed, no thrombosis or fissure noted. Long standing worse last 6 months to a year likely constipation may aggravate it.   - COLORECTAL SURGERY REFERRAL    10. Pain of right scapula  More muscular upper right back/ lower neck, suspect trapezius strain  - cyclobenzaprine (FLEXERIL) 5 MG tablet; Take 1 tablet (5 mg) by mouth At Bedtime for 14 days  Dispense: 14 tablet; Refill: 0  - ORTHO  REFERRAL    11. Neck pain  - cyclobenzaprine (FLEXERIL) 5 MG tablet; Take 1 tablet (5 mg) by mouth At Bedtime for 14 days  Dispense: 14 tablet; Refill: 0  - ORTHO  REFERRAL    12. Penicillin allergy  - ALLERGY/ASTHMA ADULT REFERRAL    13. Strain of trapezius muscle, right, initial encounter  Continue with being active  and modify activity that causes more pain. Ice then heat 20 min twice a day. ibuprofen 600 mg with food three times a day 5 days. Flexeril 5 mg  Bedtime for 2 weeks.  ortho consult for further evaluation and treatment. Consider PT  - ORTHO  REFERRAL    14. Left foot pain  Exam benign , per [place]  "rules no xray indicated. Ibuprofen, rest , contrast baths, see ortho , may benefit from inserts and good supportive arch shoes. if not better consider imaging.  - ORTHO  REFERRAL    COUNSELING:   Reviewed preventive health counseling, as reflected in patient instructions       Regular exercise       Healthy diet/nutrition       Vision screening       Immunizations    Vaccinated for: TDAP      BP Screening:   Last 3 BP Readings:    BP Readings from Last 3 Encounters:   06/06/17 130/74   02/08/17 128/75   05/11/16 123/68       The following was recommended to the patient:  Re-screen BP within a year and recommended lifestyle modifications     reports that she quit smoking about 18 years ago. Her smoking use included Cigarettes. She started smoking about 21 years ago. She has a 1.00 pack-year smoking history. She does not have any smokeless tobacco history on file.    Estimated body mass index is 26.4 kg/(m^2) as calculated from the following:    Height as of this encounter: 5' 8.75\" (1.746 m).    Weight as of this encounter: 177 lb 8 oz (80.5 kg).   Weight management plan: Discussed healthy diet and exercise guidelines and patient will follow up in 12 months in clinic to re-evaluate.    Counseling Resources:  ATP IV Guidelines  Pooled Cohorts Equation Calculator  Breast Cancer Risk Calculator  FRAX Risk Assessment  ICSI Preventive Guidelines  Dietary Guidelines for Americans, 2010  USDA's MyPlate  ASA Prophylaxis  Lung CA Screening    Rosario Kang MD  [facility]  "

## 2017-06-08 ENCOUNTER — PRE VISIT (OUTPATIENT)
Dept: SURGERY | Facility: CLINIC | Age: 37
End: 2017-06-08

## 2017-06-08 NOTE — PROGRESS NOTES
Keith Ms. Kovacs,  Your results came back and are within acceptable limits. -Vitamin D level is normal, 1000 IU daily in diet or supplements is recommended. . If you have any further concerns please do not hesitate to contact us by message, phone or making an appointment.  Have a good day   Dr Jorge Luis WARNER

## 2017-06-08 NOTE — TELEPHONE ENCOUNTER
1.  Date/reason for appt: 6/19/17- external hemorrhoids     2.  Referring provider: Dr. Rosario Kang - internal     3.  Call to patient (Yes / No - short description): No - pt is referred.     4.  Previous care at / records requested from:     1. [facility]   2. [facility]   3. [facility]   4. [facility] - transferred records were received, forwarded to clinic.

## 2017-06-19 ENCOUNTER — OFFICE VISIT (OUTPATIENT)
Dept: SURGERY | Facility: CLINIC | Age: 37
End: 2017-06-19

## 2017-06-19 VITALS
SYSTOLIC BLOOD PRESSURE: 131 MMHG | HEIGHT: 69 IN | WEIGHT: 182.7 LBS | HEART RATE: 70 BPM | DIASTOLIC BLOOD PRESSURE: 87 MMHG | OXYGEN SATURATION: 98 % | BODY MASS INDEX: 27.06 KG/M2

## 2017-06-19 DIAGNOSIS — K64.8 INTERNAL HEMORRHOIDS: Primary | ICD-10-CM

## 2017-06-19 ASSESSMENT — ENCOUNTER SYMPTOMS
BLOATING: 1
NAUSEA: 0
HEARTBURN: 1
CONSTIPATION: 1
RECTAL PAIN: 1
VOMITING: 0
BOWEL INCONTINENCE: 0
ABDOMINAL PAIN: 0
BLOOD IN STOOL: 0
RECTAL BLEEDING: 1
DIARRHEA: 0
JAUNDICE: 0

## 2017-06-19 ASSESSMENT — PAIN SCALES - GENERAL: PAINLEVEL: NO PAIN (0)

## 2017-06-19 NOTE — PROGRESS NOTES
"Colon and Rectal Surgery Consult Clinic Note    Date: 2017     Referring provider:  Rosario Kang MD  [facility] [place] [id number] [place] [address]     RE: Ranjana Kovacs  : 1980  TOO: 2017    Ranjana Kovacs is a very pleasant 37 year old female with a history of irritable bowel syndrome with constipation and diarrhea with a recent diagnosis of hemorrhoids.  Given these findings they were subsequently sent to the Colon and Rectal Surgery Clinic for an opinion on this and a new patient consultation.     Ms. Kovacs follows with Nichole Anderson NP in gastroenterology for chronic constipation and diarrhea. She reports that her constipation has improved but has not completely resolved. She is on fiber and MiraLAX both as needed. She gets hemorrhoid flareups every month where she has an external bump and some soreness and aching. She denies any sharp pain. She gets very rare bright red blood with bowel movements. She underwent a colonoscopy in 2016 which was normal with normal biopsies. She denies a family history of any colon cancer. She is not on any blood thinners.    Assessment/Plan: 37 year old female with chronic constipation and hemorrhoids. On exam she has a small anterior midline skin tag and some small internal hemorrhoids. No significant internal hemorrhoids and no active bleeding. She additionally had some fecal material present under perianal skin. Discussed that her symptoms are likely related to her chronic constipation and could also be related to some seepage fecal material. Advised to remain on a fiber supplement long-term as this may improve her symptoms. No significant hemorrhoids or banding. However, asked her to return to clinic if she has another \"flareup\" it is possible she is getting external hemorrhoid or possibly an anal fissure.  Patient's questions were answered to her stated satisfaction and she is in agreement with this plan.    Medical " history:  Past Medical History:   Diagnosis Date     Anemia     long history of anemia, often take iron     Constipation      Endometriosis     s/p ROCKY/ BSO 2012     History of anemia     on iron in the past      History of high cholesterol     was on lipitor controlled on diet no longer on statin, tested in [facility] 2015 wnl      Uncomplicated asthma     inflamatory asthma       Surgical history:  Past Surgical History:   Procedure Laterality Date     ABDOMEN SURGERY       COLONOSCOPY  4/27/16    diverticulosis, polyp removed,      GENITOURINARY SURGERY       HYSTERECTOMY, PAP NO LONGER INDICATED  7/20/2012    total per records reviewed      HYSTEROSCOPY DIAGNOSTIC  2011       Problem list:  Patient Active Problem List    Diagnosis Date Noted     Irritant contact dermatitis due to drug in contact with skin: generic estrogen patch 02/08/2017     Priority: Medium     History of endometriosis s/p TAHBSO 03/01/2016     Priority: Medium     Constipation, unspecified constipation type 03/01/2016     Priority: Medium     Vitamin D deficiency 04/27/2015     Priority: Medium       Medications:  Current Outpatient Prescriptions   Medication Sig Dispense Refill     cyclobenzaprine (FLEXERIL) 5 MG tablet Take 1 tablet (5 mg) by mouth At Bedtime for 14 days 14 tablet 0     progesterone (PROMETRIUM) 100 MG capsule Take 1 capsule (100 mg) by mouth daily 30 capsule 0     letrozole (FEMARA) 2.5 MG tablet Take 1 tablet (2.5 mg) by mouth daily 90 tablet 3     estradiol (VIVELLE-DOT) 0.075 MG/24HR BIW patch Place 1 patch onto the skin twice a week 12 patch 3     Cholecalciferol (VITAMIN D3) 2000 UNITS CAPS        polyethylene glycol (MIRALAX/GLYCOLAX) packet Take 1 packet by mouth 2 times daily         Allergies:  Allergies   Allergen Reactions     Penicillins      Very high fevers       Family history:  Family History   Problem Relation Age of Onset     Medical History Unknown Mother      ? gastric issues not specified     Other -  "See Comments Mother      diverticulitis     Coronary Artery Disease Father      MI     Hypertension Father      Hyperlipidemia Father      Lactose Intolerance Brother      and father     Colon Cancer No family hx of        Social history:  Social History   Substance Use Topics     Smoking status: Former Smoker     Packs/day: 0.50     Years: 2.00     Types: Cigarettes     Start date: 1/1/1996     Quit date: 1/1/1999     Smokeless tobacco: Not on file     Alcohol use 0.0 oz/week      Comment: occasional     Marital status: single.    Nursing Notes:   Cheo Neal CMA  6/19/2017  1:10 PM  Signed  Chief Complaint   Patient presents with     Consult     External hemorrhoids.       Vitals:    06/19/17 1307   BP: 131/87   Pulse: 70   SpO2: 98%   Weight: 182 lb 11.2 oz   Height: 5' 8.75\"       Body mass index is 27.18 kg/(m^2).    hCeo Neal CMA                     Physical Examination:  /87  Pulse 70  Ht 5' 8.75\"  Wt 182 lb 11.2 oz  SpO2 98%  BMI 27.18 kg/m2  General: alert, oriented, in no acute distress, sitting comfortably  HEENT: mucous membranes moist  Perianal external examination:  Perianal skin: Intact with no excoriation or lichenification.  Lesions: No.  Eversion of buttocks: There was not evidence of an anal fissure. Details: N/A.  Skin tags or external hemorrhoids: Yes: anterior midline anal skin tag.  Digital rectal examination: Was performed.   Sphincter tone: Good.  Palpable lesions: No.  Other: None.  Bimanual examination: was not performed.    Anoscopy: Was performed.   Hemorrhoids: Yes. Grade 1-2 internal hemorrhoids without bleeding  Lesions: No.    Total face to face time was 20 minutes, >50% counseling.    MANDY Zacarias, NP-C  Colon and Rectal Surgery   [facility]    This note was created using speech recognition software and may contain unintended word substitutions.    "

## 2017-06-19 NOTE — MR AVS SNAPSHOT
After Visit Summary   6/19/2017    Ranjana Kovacs    MRN: [id number]           Patient Information     Date Of Birth          1980        Visit Information        Provider Department      6/19/2017 1:00 PM Nicolle Bender APRN [facility] Colon and Rectal Surgery        Today's Diagnoses     Internal hemorrhoids    -  1       Follow-ups after your visit        Your next 10 appointments already scheduled     Jun 22, 2017  3:30 PM CDT   Return Visit with Naz Cummings MD   [facility] ([facility])    [facility] [address]  [address]  [address]  [facility] [address] [phone]              Who to contact     Please call your clinic at [phone] to:    Ask questions about your health    Make or cancel appointments    Discuss your medicines    Learn about your test results    Speak to your doctor   If you have compliments or concerns about an experience at your clinic, or if you wish to file a complaint, please contact [facility] Patient Relations at [phone] or email us at [email or web address]         Additional Information About Your Visit        MyChart Information     [employer] gives you secure access to your electronic health record. If you see a primary care provider, you can also send messages to your care team and make appointments. If you have questions, please call your primary care clinic.  If you do not have a primary care provider, please call [phone] and they will assist you.      Sample[employer] is an electronic gateway that provides easy, online access to your medical records. With Sample[employer], you can request a clinic appointment, read your test results, renew a prescription or communicate with your care team.     To access your existing account, please contact your [facility] or call [phone] for assistance.        Care EveryWhere  "ID     This is your Care EveryWhere ID. This could be used by other organizations to access your [place] medical records  [id number]        Your Vitals Were     Pulse Height Pulse Oximetry BMI (Body Mass Index)          70 5' 8.75\" 98% 27.18 kg/m2         Blood Pressure from Last 3 Encounters:   06/19/17 131/87   06/06/17 130/74   02/08/17 128/75    Weight from Last 3 Encounters:   06/19/17 182 lb 11.2 oz   06/06/17 177 lb 8 oz   02/08/17 178 lb 11.2 oz              We Performed the Following     ANOSCOPY W/WO BRUSH/WASH          Today's Medication Changes          These changes are accurate as of: 6/19/17  5:26 PM.  If you have any questions, ask your nurse or doctor.               Stop taking these medicines if you haven't already. Please contact your care team if you have questions.     dicyclomine 10 MG capsule   Commonly known as:  BENTYL   Stopped by:  Nicolle Bender APRN CNP           hyoscyamine 0.125 MG sublingual tablet   Commonly known as:  LEVSIN/SL   Stopped by:  Nicolle Bender APRN CNP                    Primary Care Provider Office Phone # Fax #    Rosario Kang [phone]       [place] [id number]        Thank you!     Thank you for choosing [facility] COLON AND RECTAL SURGERY  for your care. Our goal is always to provide you with excellent care. Hearing back from our patients is one way we can continue to improve our services. Please take a few minutes to complete the written survey that you may receive in the mail after your visit with us. Thank you!             Your Updated Medication List - Protect others around you: Learn how to safely use, store and throw away your medicines at www.disposemymeds.org.          This list is accurate as of: 6/19/17  5:26 PM.  Always use your most recent med list.                   Brand Name Dispense Instructions for use    cyclobenzaprine 5 MG tablet    FLEXERIL    14 tablet "    Take 1 tablet (5 mg) by mouth At Bedtime for 14 days       estradiol 0.075 MG/24HR BIW patch    VIVELLE-DOT    12 patch    Place 1 patch onto the skin twice a week       letrozole 2.5 MG tablet    FEMARA    90 tablet    Take 1 tablet (2.5 mg) by mouth daily       polyethylene glycol Packet    MIRALAX/GLYCOLAX     Take 1 packet by mouth 2 times daily       progesterone 100 MG capsule    PROMETRIUM    30 capsule    Take 1 capsule (100 mg) by mouth daily       vitamin D3 2000 UNITS Caps

## 2017-06-19 NOTE — LETTER
"2017      RE: Ranjana Kovacs  [address]  [facility] [address]       Colon and Rectal Surgery Consult Clinic Note    Date: 2017     Referring provider:  Rosario Kang MD  [facility] [place] [address] [address]     RE: Ranjana Kovacs  : 1980  TOO: 2017    Ranjana Kovacs is a very pleasant 37 year old female with a history of irritable bowel syndrome with constipation and diarrhea with a recent diagnosis of hemorrhoids.  Given these findings they were subsequently sent to the Colon and Rectal Surgery Clinic for an opinion on this and a new patient consultation.     Ms. Kovacs follows with Nichole Anderson NP in gastroenterology for chronic constipation and diarrhea. She reports that her constipation has improved but has not completely resolved. She is on fiber and MiraLAX both as needed. She gets hemorrhoid flareups every month where she has an external bump and some soreness and aching. She denies any sharp pain. She gets very rare bright red blood with bowel movements. She underwent a colonoscopy in 2016 which was normal with normal biopsies. She denies a family history of any colon cancer. She is not on any blood thinners.    Assessment/Plan: 37 year old female with chronic constipation and hemorrhoids. On exam she has a small anterior midline skin tag and some small internal hemorrhoids. No significant internal hemorrhoids and no active bleeding. She additionally had some fecal material present under perianal skin. Discussed that her symptoms are likely related to her chronic constipation and could also be related to some seepage fecal material. Advised to remain on a fiber supplement long-term as this may improve her symptoms. No significant hemorrhoids or banding. However, asked her to return to clinic if she has another \"flareup\" it is possible she is getting external hemorrhoid or possibly an anal fissure.  Patient's questions were " answered to her stated satisfaction and she is in agreement with this plan.    Medical history:  Past Medical History:   Diagnosis Date     Anemia     long history of anemia, often take iron     Constipation      Endometriosis     s/p ROCKY/ BSO 2012     History of anemia     on iron in the past      History of high cholesterol     was on lipitor controlled on diet no longer on statin, tested in [facility] 2015 wnl      Uncomplicated asthma     inflamatory asthma       Surgical history:  Past Surgical History:   Procedure Laterality Date     ABDOMEN SURGERY       COLONOSCOPY  4/27/16    diverticulosis, polyp removed,      GENITOURINARY SURGERY       HYSTERECTOMY, PAP NO LONGER INDICATED  7/20/2012    total per records reviewed      HYSTEROSCOPY DIAGNOSTIC  2011       Problem list:  Patient Active Problem List    Diagnosis Date Noted     Irritant contact dermatitis due to drug in contact with skin: generic estrogen patch 02/08/2017     Priority: Medium     History of endometriosis s/p TAHBSO 03/01/2016     Priority: Medium     Constipation, unspecified constipation type 03/01/2016     Priority: Medium     Vitamin D deficiency 04/27/2015     Priority: Medium       Medications:  Current Outpatient Prescriptions   Medication Sig Dispense Refill     cyclobenzaprine (FLEXERIL) 5 MG tablet Take 1 tablet (5 mg) by mouth At Bedtime for 14 days 14 tablet 0     progesterone (PROMETRIUM) 100 MG capsule Take 1 capsule (100 mg) by mouth daily 30 capsule 0     letrozole (FEMARA) 2.5 MG tablet Take 1 tablet (2.5 mg) by mouth daily 90 tablet 3     estradiol (VIVELLE-DOT) 0.075 MG/24HR BIW patch Place 1 patch onto the skin twice a week 12 patch 3     Cholecalciferol (VITAMIN D3) 2000 UNITS CAPS        polyethylene glycol (MIRALAX/GLYCOLAX) packet Take 1 packet by mouth 2 times daily         Allergies:  Allergies   Allergen Reactions     Penicillins      Very high fevers       Family history:  Family History   Problem Relation Age of  "Onset     Medical History Unknown Mother      ? gastric issues not specified     Other - See Comments Mother      diverticulitis     Coronary Artery Disease Father      MI     Hypertension Father      Hyperlipidemia Father      Lactose Intolerance Brother      and father     Colon Cancer No family hx of        Social history:  Social History   Substance Use Topics     Smoking status: Former Smoker     Packs/day: 0.50     Years: 2.00     Types: Cigarettes     Start date: 1/1/1996     Quit date: 1/1/1999     Smokeless tobacco: Not on file     Alcohol use 0.0 oz/week      Comment: occasional     Marital status: single.    Nursing Notes:   Cheo Neal CMA  6/19/2017  1:10 PM  Signed  Chief Complaint   Patient presents with     Consult     External hemorrhoids.       Vitals:    06/19/17 1307   BP: 131/87   Pulse: 70   SpO2: 98%   Weight: 182 lb 11.2 oz   Height: 5' 8.75\"       Body mass index is 27.18 kg/(m^2).    Cheo Neal CMA                     Physical Examination:  /87  Pulse 70  Ht 5' 8.75\"  Wt 182 lb 11.2 oz  SpO2 98%  BMI 27.18 kg/m2  General: alert, oriented, in no acute distress, sitting comfortably  HEENT: mucous membranes moist  Perianal external examination:  Perianal skin: Intact with no excoriation or lichenification.  Lesions: No.  Eversion of buttocks: There was not evidence of an anal fissure. Details: N/A.  Skin tags or external hemorrhoids: Yes: anterior midline anal skin tag.  Digital rectal examination: Was performed.   Sphincter tone: Good.  Palpable lesions: No.  Other: None.  Bimanual examination: was not performed.    Anoscopy: Was performed.   Hemorrhoids: Yes. Grade 1-2 internal hemorrhoids without bleeding  Lesions: No.    Total face to face time was 20 minutes, >50% counseling.    MANDY Zacarias, NP-C  Colon and Rectal Surgery   [facility]    This note was created using speech recognition software and may contain unintended " word substitutions.      MANDY Brush CNP

## 2017-06-19 NOTE — NURSING NOTE
"Chief Complaint   Patient presents with     Consult     External hemorrhoids.       Vitals:    06/19/17 1307   BP: 131/87   Pulse: 70   SpO2: 98%   Weight: 182 lb 11.2 oz   Height: 5' 8.75\"       Body mass index is 27.18 kg/(m^2).    Cheo Neal CMA                  "

## 2017-06-22 ENCOUNTER — OFFICE VISIT (OUTPATIENT)
Dept: OBGYN | Facility: CLINIC | Age: 37
End: 2017-06-22
Attending: OBSTETRICS & GYNECOLOGY
Payer: COMMERCIAL

## 2017-06-22 VITALS
WEIGHT: 179 LBS | DIASTOLIC BLOOD PRESSURE: 64 MMHG | HEIGHT: 69 IN | SYSTOLIC BLOOD PRESSURE: 119 MMHG | HEART RATE: 66 BPM | BODY MASS INDEX: 26.51 KG/M2

## 2017-06-22 DIAGNOSIS — E89.40 SURGICAL MENOPAUSE ON HORMONE REPLACEMENT THERAPY: ICD-10-CM

## 2017-06-22 DIAGNOSIS — Z79.890 SURGICAL MENOPAUSE ON HORMONE REPLACEMENT THERAPY: ICD-10-CM

## 2017-06-22 DIAGNOSIS — Z87.42 HISTORY OF ENDOMETRIOSIS: Primary | ICD-10-CM

## 2017-06-22 DIAGNOSIS — Z87.42 HISTORY OF ENDOMETRIOSIS: ICD-10-CM

## 2017-06-22 PROCEDURE — 99212 OFFICE O/P EST SF 10 MIN: CPT | Mod: ZF

## 2017-06-22 ASSESSMENT — PAIN SCALES - GENERAL: PAINLEVEL: NO PAIN (0)

## 2017-06-22 NOTE — LETTER
6/22/2017       RE: Ranjana Kovacs  [address]  [facility] [address]     Dear Colleague,    Thank you for referring your patient, Ranjana Kovacs, to the [facility] at [facility]. Please see a copy of my visit note below.    36 yo here for follow up endometriosis. Doing well on HRT with letrozole.     Reviewed risks/benefits.     Past Medical History:   Diagnosis Date     Anemia     long history of anemia, often take iron     Constipation      Endometriosis     s/p ROCKY/ BSO 2012     History of anemia     on iron in the past      History of high cholesterol     was on lipitor controlled on diet no longer on statin, tested in [facility] 2015 wnl      Uncomplicated asthma     inflamatory asthma     Past Surgical History:   Procedure Laterality Date     ABDOMEN SURGERY       COLONOSCOPY  4/27/16    diverticulosis, polyp removed,      GENITOURINARY SURGERY       HYSTERECTOMY, PAP NO LONGER INDICATED  7/20/2012    total per records reviewed      HYSTEROSCOPY DIAGNOSTIC  2011     Obstetric History     No data available          No exam today.   Total visit time was 10 minutes with 10 minutes spent in counseling and coordination of care for endo .      Rec DEXA and annual refills    Again, thank you for allowing me to participate in the care of your patient.      Sincerely,    Naz Cummings MD

## 2017-06-22 NOTE — MR AVS SNAPSHOT
After Visit Summary   6/22/2017    Ranjana Kovacs    MRN: [id number]           Patient Information     Date Of Birth          1980        Visit Information        Provider Department      6/22/2017 3:30 PM Naz Cummings MD [facility]        Today's Diagnoses     History of endometriosis s/p TAHBSO    -  1    Surgical menopause on hormone replacement therapy        History of endometriosis           Follow-ups after your visit        Future tests that were ordered for you today     Open Future Orders        Priority Expected Expires Ordered    Dexa hip/pelvis/spine Routine  6/23/2018 6/23/2017            Who to contact     Please call your clinic at [phone] to:    Ask questions about your health    Make or cancel appointments    Discuss your medicines    Learn about your test results    Speak to your doctor   If you have compliments or concerns about an experience at your clinic, or if you wish to file a complaint, please contact [facility] Patient Relations at [phone] or email us at [email or web address]         Additional Information About Your Visit        MyChart Information     [email or web address] gives you secure access to your electronic health record. If you see a primary care provider, you can also send messages to your care team and make appointments. If you have questions, please call your primary care clinic.  If you do not have a primary care provider, please call [phone] and they will assist you.      [email or web address] is an electronic gateway that provides easy, online access to your medical records. With [email or web address], you can request a clinic appointment, read your test results, renew a prescription or communicate with your care team.     To access your existing account, please contact your [facility] or call [phone] for assistance.        Care EveryWhere ID     This is your Care EveryWhere  "ID. This could be used by other organizations to access your [place] medical records  [id number]        Your Vitals Were     Pulse Height BMI (Body Mass Index)             66 1.746 m (5' 8.75\") 26.63 kg/m2          Blood Pressure from Last 3 Encounters:   06/22/17 119/64   06/19/17 131/87   06/06/17 130/74    Weight from Last 3 Encounters:   06/22/17 81.2 kg (179 lb)   06/19/17 82.9 kg (182 lb 11.2 oz)   06/06/17 80.5 kg (177 lb 8 oz)                 Where to get your medicines      These medications were sent to [facility] [address] [place] [address] AT [address] [address]     Phone:  [phone]     progesterone 100 MG capsule          Primary Care Provider Office Phone # Fax #    Rosario Kang [phone]       [facility] [address] [facility] [address]        Equal Access to Services     [facility]: Hadii inés rawls hadasho Soomaali, waaxda luqadaha, qaybta kaalmada adenimo, smooth ortega . So [facility] [phone].    ATENCIÓN: Si habla español, tiene a samayoa disposición servicios gratuitos de asistencia lingüística. Leonora al [phone].    We comply with applicable federal civil rights laws and [place] laws. We do not discriminate on the basis of race, color, national origin, age, disability sex, sexual orientation or gender identity.            Thank you!     Thank you for choosing [facility]  for your care. Our goal is always to provide you with excellent care. Hearing back from our patients is one way we can continue to improve our services. Please take a few minutes to complete the written survey that you may receive in the mail after your visit with us. Thank you!             Your Updated Medication List - Protect others around you: Learn how to safely use, store and throw away your medicines at www.disposemymeds.org.          This list is accurate as of: 6/22/17 11:59 " PM.  Always use your most recent med list.                   Brand Name Dispense Instructions for use Diagnosis    cholecalciferol 1000 UNIT tablet    vitamin D     Take by mouth daily        estradiol 0.075 MG/24HR BIW patch    VIVELLE-DOT    12 patch    Place 1 patch onto the skin twice a week    Surgical menopause on hormone replacement therapy, History of endometriosis       letrozole 2.5 MG tablet    FEMARA    90 tablet    Take 1 tablet (2.5 mg) by mouth daily    Endometriosis       polyethylene glycol Packet    MIRALAX/GLYCOLAX     Take 1 packet by mouth 2 times daily        progesterone 100 MG capsule    PROMETRIUM    90 capsule    Take 1 capsule (100 mg) by mouth daily    Surgical menopause on hormone replacement therapy, History of endometriosis

## 2017-06-23 NOTE — PROGRESS NOTES
38 yo here for follow up endometriosis. Doing well on HRT with letrozole.     Reviewed risks/benefits.     Past Medical History:   Diagnosis Date     Anemia     long history of anemia, often take iron     Constipation      Endometriosis     s/p ROCKY/ BSO 2012     History of anemia     on iron in the past      History of high cholesterol     was on lipitor controlled on diet no longer on statin, tested in [facility] 2015 wnl      Uncomplicated asthma     inflamatory asthma     Past Surgical History:   Procedure Laterality Date     ABDOMEN SURGERY       COLONOSCOPY  4/27/16    diverticulosis, polyp removed,      GENITOURINARY SURGERY       HYSTERECTOMY, PAP NO LONGER INDICATED  7/20/2012    total per records reviewed      HYSTEROSCOPY DIAGNOSTIC  2011     Obstetric History     No data available            No exam today.   Total visit time was 10 minutes with 10 minutes spent in counseling and coordination of care for endo .      Rec DEXA and annual refills  Naz Cummings

## 2017-08-15 ENCOUNTER — OFFICE VISIT (OUTPATIENT)
Dept: OPHTHALMOLOGY | Facility: CLINIC | Age: 37
End: 2017-08-15

## 2017-08-15 DIAGNOSIS — Q14.1 CONGENITAL HYPERTROPHY OF RETINAL PIGMENT EPITHELIUM: ICD-10-CM

## 2017-08-15 DIAGNOSIS — H52.13 MYOPIA OF BOTH EYES: Primary | ICD-10-CM

## 2017-08-15 ASSESSMENT — VISUAL ACUITY
OS_CC: J1+
METHOD: SNELLEN - LINEAR
OS_CC: 20/20
OD_CC: J1+
CORRECTION_TYPE: GLASSES
OD_CC: 20/20

## 2017-08-15 ASSESSMENT — EXTERNAL EXAM - LEFT EYE: OS_EXAM: NORMAL

## 2017-08-15 ASSESSMENT — CUP TO DISC RATIO
OD_RATIO: 0.3
OS_RATIO: 0.3

## 2017-08-15 ASSESSMENT — SLIT LAMP EXAM - LIDS
COMMENTS: NORMAL
COMMENTS: NORMAL

## 2017-08-15 ASSESSMENT — REFRACTION_WEARINGRX
OS_CYLINDER: +2.00
OS_SPHERE: -9.00
SPECS_TYPE: SVL
OD_SPHERE: -10.00
OS_AXIS: 100
OD_AXIS: 084
OD_CYLINDER: +1.50

## 2017-08-15 ASSESSMENT — TONOMETRY
OS_IOP_MMHG: 16
IOP_METHOD: ICARE
OD_IOP_MMHG: 16

## 2017-08-15 ASSESSMENT — CONF VISUAL FIELD
OS_NORMAL: 1
METHOD: COUNTING FINGERS
OD_NORMAL: 1

## 2017-08-15 ASSESSMENT — REFRACTION_MANIFEST
OS_SPHERE: -8.50
OD_SPHERE: -9.75
OS_CYLINDER: +2.00
OS_AXIS: 101
OD_AXIS: 095
OD_CYLINDER: +1.25

## 2017-08-15 ASSESSMENT — EXTERNAL EXAM - RIGHT EYE: OD_EXAM: NORMAL

## 2017-08-15 NOTE — PROGRESS NOTES
Assessment/Plan  (H52.13) Myopia of both eyes  (primary encounter diagnosis)  Comment: High myopia OU  Plan: HC CONTACT LENS FITTING COSMETIC LVL 1 (94790.011), REFRACTION [[id number]]   Educated patient on condition and clinical findings. Dispensed spectacle prescription for full time wear. Educated patient on possibility of adaptation period, if symptoms do not improve return to clinic for further testing. Return to clinic in 2 weeks for contact lens fitting. 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    Scarlett Neil, Optometry Extern    Teaching Statement:    Complete documentation of historical and exam elements from today's encounter can  be found in the full encounter summary report (not reduplicated in this progress  note). I personally obtained the chief complaint(s) and history of present illness. I  confirmed and edited as necessary the review of systems, past medical/surgical  history, family history, social history, and examination findings as documented by  others; and I examined the patient myself. I personally reviewed the relevant tests,  images, and reports as documented above. I formulated and edited as necessary the  assessment and plan and discussed the findings and management plan with the  patient and family.    Shyam Jennings, LINDSAY, FAAO

## 2017-08-15 NOTE — NURSING NOTE
"Chief Complaints and History of Present Illnesses   Patient presents with     Photophobia Both Eyes     Eye Problem Both Eyes     \"issues with gls and CTL\"     HPI    Affected eye(s):  Both   Symptoms:     Blurred vision   Floaters (Comment: no changes per pt, may notice them a little more lately)   No flashes   Glare (Comment: at night with lights per pt)   Photophobia (Comment: x 1 year, notices it more when wearing CTL, feels she may need antiglare coating in gls. )         Do you have eye pain now?:  No      Comments:  Patient notes the last times she wore CTL was a few weeks ago, feels that they make her eyes more light sensitive.  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    Renee Stratton August 15, 2017 12:48 PM               "

## 2017-08-15 NOTE — MR AVS SNAPSHOT
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      Who to contact     Please call your clinic at [phone] to:    Ask questions about your health    Make or cancel appointments    Discuss your medicines    Learn about your test results    Speak to your doctor   If you have compliments or concerns about an experience at your clinic, or if you wish to file a complaint, please contact [facility] Patient Relations at [phone] or email us at [email or web address]         Additional Information About Your Visit        MyChart Information     [employer] [facility] gives you secure access to your electronic health record. If you see a primary care provider, you can also send messages to your care team and make appointments. If you have questions, please call your primary care clinic.  If you do not have a primary care provider, please call [phone] and they will assist you.      [employer] is an electronic gateway that provides easy, online access to your medical records. With [employer], you can request a clinic appointment, read your test results, renew a prescription or communicate with your care team.     To access your existing account, please contact your [facility] Clinic or call [phone] for assistance.        Care EveryWhere ID     This is your Care EveryWhere ID. This could be used by other organizations to access your [place] medical records  [id number]          Blood Pressure from Last 3 Encounters:   06/22/17 119/64   06/19/17 131/87   06/06/17 130/74    Weight from Last 3 Encounters:   06/22/17 81.2 kg (179 lb)   06/19/17 82.9 kg (182 lb 11.2 oz)   06/06/17 80.5 kg (177 lb 8 oz)              We Performed the Following     HC CONTACT LENS FITTING COSMETIC LVL 1 (32204.011)     REFRACTION [[id number]]        Primary Care Provider Office Phone # Fax #    Rosraio Kang [phone]       [address]  [facility] [address]        Equal Access to Services     [facility] Center[facility] : Hadii inés rawls hadlango Sochrissy, waaxda luwestleyadaha, qaybta kaalmada rogelio, smooth guerrero. So [facility] [phone].    ATENCIÓN: Si habla español, tiene a samayoa disposición servicios gratuitos de asistencia lingüística. [facility] [phone].    We comply with applicable federal civil rights laws and [place] laws. We do not discriminate on the basis of race, color, national origin, age, disability sex, sexual orientation or gender identity.            Thank you!     Thank you for choosing [facility]  for your care. Our goal is always to provide you with excellent care. Hearing back from our patients is one way we can continue to improve our services. Please take a few minutes to complete the written survey that you may receive in the mail after your visit with us. Thank you!             Your Updated Medication List - Protect others around you: Learn how to safely use, store and throw away your medicines at www.disposemymeds.org.          This list is accurate as of: 8/15/17  1:58 PM.  Always use your most recent med list.                   Brand Name Dispense Instructions for use Diagnosis    cholecalciferol 1000 UNIT tablet    vitamin D     Take by mouth daily        estradiol 0.075 MG/24HR BIW patch    VIVELLE-DOT    12 patch    Place 1 patch onto the skin twice a week    Surgical menopause on hormone replacement therapy, History of endometriosis        letrozole 2.5 MG tablet    FEMARA    90 tablet    Take 1 tablet (2.5 mg) by mouth daily    Endometriosis       polyethylene glycol Packet    MIRALAX/GLYCOLAX     Take 1 packet by mouth 2 times daily        progesterone 100 MG capsule    PROMETRIUM    90 capsule    Take 1 capsule (100 mg) by mouth daily    Surgical menopause on hormone replacement therapy, History of endometriosis

## 2017-09-05 ENCOUNTER — OFFICE VISIT (OUTPATIENT)
Dept: URGENT CARE | Facility: URGENT CARE | Age: 37
End: 2017-09-05
Payer: COMMERCIAL

## 2017-09-05 VITALS
DIASTOLIC BLOOD PRESSURE: 68 MMHG | TEMPERATURE: 99.1 F | SYSTOLIC BLOOD PRESSURE: 116 MMHG | OXYGEN SATURATION: 96 % | HEART RATE: 64 BPM

## 2017-09-05 DIAGNOSIS — J06.9 VIRAL UPPER RESPIRATORY TRACT INFECTION: Primary | ICD-10-CM

## 2017-09-05 PROCEDURE — 99213 OFFICE O/P EST LOW 20 MIN: CPT | Performed by: PHYSICIAN ASSISTANT

## 2017-09-05 NOTE — PROGRESS NOTES
SUBJECTIVE:  Ranjana Kovacs is a 37 year old female who presents to the clinic today with a chief complaint of cough  for 6 day(s).  Her cough is described as productive of sputum.    The patient's symptoms are moderate and stable.  Associated symptoms include congestion and rhinorrhea. The patient's symptoms are exacerbated by lying down  Patient has been using dayquil, nyquil, rest  to improve symptoms.    Past Medical History:   Diagnosis Date     Anemia     long history of anemia, often take iron     Constipation      Endometriosis     s/p ROCKY/ BSO 2012     History of anemia     on iron in the past      History of high cholesterol     was on lipitor controlled on diet no longer on statin, tested in [facility] 2015 wnl      Uncomplicated asthma     inflamatory asthma       Current Outpatient Prescriptions   Medication Sig Dispense Refill     cholecalciferol (VITAMIN D3) 1000 UNIT tablet Take by mouth daily       progesterone (PROMETRIUM) 100 MG capsule Take 1 capsule (100 mg) by mouth daily 90 capsule 3     letrozole (FEMARA) 2.5 MG tablet Take 1 tablet (2.5 mg) by mouth daily 90 tablet 3     estradiol (VIVELLE-DOT) 0.075 MG/24HR BIW patch Place 1 patch onto the skin twice a week 12 patch 3     polyethylene glycol (MIRALAX/GLYCOLAX) packet Take 1 packet by mouth 2 times daily         Social History   Substance Use Topics     Smoking status: Former Smoker     Packs/day: 0.50     Years: 2.00     Types: Cigarettes     Start date: 1/1/1996     Quit date: 1/1/1999     Smokeless tobacco: Never Used     Alcohol use 0.0 oz/week      Comment: occasional        ROS  Review of systems negative except as stated above.    OBJECTIVE:  /68 (BP Location: Right arm, Patient Position: Chair, Cuff Size: Adult Regular)  Pulse 64  Temp 99.1  F (37.3  C) (Tympanic)  SpO2 96%  GENERAL APPEARANCE: healthy, alert and no distress  EYES: EOMI,  PERRL, conjunctiva clear  HENT: ear canals and TM's normal.  Nose and mouth  without ulcers, erythema or lesions  NECK: supple, nontender, no lymphadenopathy  RESP: lungs clear to auscultation - no rales, rhonchi or wheezes  CV: regular rates and rhythm, normal S1 S2, no murmur noted    ASSESSMENT:    (J06.9,  B97.89) Viral upper respiratory tract infection  (primary encounter diagnosis)      Patient Instructions   Sinus rinse, rest, fluids, humidifier  Follow up if worsening      Viral Upper Respiratory Illness (Adult)  You have a viral upper respiratory illness (URI), which is another term for the common cold. This illness is contagious during the first few days. It is spread through the air by coughing and sneezing. It may also be spread by direct contact (touching the sick person and then touching your own eyes, nose, or mouth). Frequent handwashing will decrease risk of spread. Most viral illnesses go away within 7 to 10 days with rest and simple home remedies. Sometimes the illness may last for several weeks. Antibiotics will not kill a virus, and they are generally not prescribed for this condition.    Home care    If symptoms are severe, rest at home for the first 2 to 3 days. When you resume activity, don't let yourself get too tired.    Avoid being exposed to cigarette smoke (yours or others ).    You may use acetaminophen or ibuprofen to control pain and fever, unless another medicine was prescribed. (Note: If you have chronic liver or kidney disease, have ever had a stomach ulcer or gastrointestinal bleeding, or are taking blood-thinning medicines, talk with your healthcare provider before using these medicines.) Aspirin should never be given to anyone under 18 years of age who is ill with a viral infection or fever. It may cause severe liver or brain damage.    Your appetite may be poor, so a light diet is fine. Avoid dehydration by drinking 6 to 8 glasses of fluids per day (water, soft drinks, juices, tea, or soup). Extra fluids will help loosen secretions in the nose and  lungs.    Over-the-counter cold medicines will not shorten the length of time you re sick, but they may be helpful for the following symptoms: cough, sore throat, and nasal and sinus congestion. (Note: Do not use decongestants if you have high blood pressure.)  Follow-up care  Follow up with your healthcare provider, or as advised.  When to seek medical advice  Call your healthcare provider right away if any of these occur:    Cough with lots of colored sputum (mucus)    Severe headache; face, neck, or ear pain    Difficulty swallowing due to throat pain    Fever of 100.4 F (38 C)  Call 911, or get immediate medical care  Call emergency services right away if any of these occur:    Chest pain, shortness of breath, wheezing, or difficulty breathing    Coughing up blood    Inability to swallow due to throat pain  Date Last Reviewed: 9/13/2015 2000-2017 [employer]. [address] [place] [address], [place] [address]. All rights reserved. This information is not intended as a substitute for professional medical care. Always follow your healthcare professional's instructions.

## 2017-09-05 NOTE — PATIENT INSTRUCTIONS
Sinus rinse, rest, fluids, humidifier  Follow up if worsening      Viral Upper Respiratory Illness (Adult)  You have a viral upper respiratory illness (URI), which is another term for the common cold. This illness is contagious during the first few days. It is spread through the air by coughing and sneezing. It may also be spread by direct contact (touching the sick person and then touching your own eyes, nose, or mouth). Frequent handwashing will decrease risk of spread. Most viral illnesses go away within 7 to 10 days with rest and simple home remedies. Sometimes the illness may last for several weeks. Antibiotics will not kill a virus, and they are generally not prescribed for this condition.    Home care    If symptoms are severe, rest at home for the first 2 to 3 days. When you resume activity, don't let yourself get too tired.    Avoid being exposed to cigarette smoke (yours or others ).    You may use acetaminophen or ibuprofen to control pain and fever, unless another medicine was prescribed. (Note: If you have chronic liver or kidney disease, have ever had a stomach ulcer or gastrointestinal bleeding, or are taking blood-thinning medicines, talk with your healthcare provider before using these medicines.) Aspirin should never be given to anyone under 18 years of age who is ill with a viral infection or fever. It may cause severe liver or brain damage.    Your appetite may be poor, so a light diet is fine. Avoid dehydration by drinking 6 to 8 glasses of fluids per day (water, soft drinks, juices, tea, or soup). Extra fluids will help loosen secretions in the nose and lungs.    Over-the-counter cold medicines will not shorten the length of time you re sick, but they may be helpful for the following symptoms: cough, sore throat, and nasal and sinus congestion. (Note: Do not use decongestants if you have high blood pressure.)  Follow-up care  Follow up with your healthcare provider, or as advised.  When to seek  medical advice  Call your healthcare provider right away if any of these occur:    Cough with lots of colored sputum (mucus)    Severe headache; face, neck, or ear pain    Difficulty swallowing due to throat pain    Fever of 100.4 F (38 C)  Call 911, or get immediate medical care  Call emergency services right away if any of these occur:    Chest pain, shortness of breath, wheezing, or difficulty breathing    Coughing up blood    Inability to swallow due to throat pain  Date Last Reviewed: 9/13/2015 2000-2017 [employer]. [address] [place], [place] [id number]. All rights reserved. This information is not intended as a substitute for professional medical care. Always follow your healthcare professional's instructions.

## 2017-09-05 NOTE — MR AVS SNAPSHOT
After Visit Summary   9/5/2017    Ranjana Kovacs    MRN: [id number]           Patient Information     Date Of Birth          1980        Visit Information        Provider Department      9/5/2017 1:50 PM ARTIS  PROVIDER [facility]        Care Instructions    Sinus rinse, rest, fluids, humidifier  Follow up if worsening      Viral Upper Respiratory Illness (Adult)  You have a viral upper respiratory illness (URI), which is another term for the common cold. This illness is contagious during the first few days. It is spread through the air by coughing and sneezing. It may also be spread by direct contact (touching the sick person and then touching your own eyes, nose, or mouth). Frequent handwashing will decrease risk of spread. Most viral illnesses go away within 7 to 10 days with rest and simple home remedies. Sometimes the illness may last for several weeks. Antibiotics will not kill a virus, and they are generally not prescribed for this condition.    Home care    If symptoms are severe, rest at home for the first 2 to 3 days. When you resume activity, don't let yourself get too tired.    Avoid being exposed to cigarette smoke (yours or others ).    You may use acetaminophen or ibuprofen to control pain and fever, unless another medicine was prescribed. (Note: If you have chronic liver or kidney disease, have ever had a stomach ulcer or gastrointestinal bleeding, or are taking blood-thinning medicines, talk with your healthcare provider before using these medicines.) Aspirin should never be given to anyone under 18 years of age who is ill with a viral infection or fever. It may cause severe liver or brain damage.    Your appetite may be poor, so a light diet is fine. Avoid dehydration by drinking 6 to 8 glasses of fluids per day (water, soft drinks, juices, tea, or soup). Extra fluids will help loosen secretions in the nose and lungs.    Over-the-counter cold medicines will  not shorten the length of time you re sick, but they may be helpful for the following symptoms: cough, sore throat, and nasal and sinus congestion. (Note: Do not use decongestants if you have high blood pressure.)  Follow-up care  Follow up with your healthcare provider, or as advised.  When to seek medical advice  Call your healthcare provider right away if any of these occur:    Cough with lots of colored sputum (mucus)    Severe headache; face, neck, or ear pain    Difficulty swallowing due to throat pain    Fever of 100.4 F (38 C)  Call 911, or get immediate medical care  Call emergency services right away if any of these occur:    Chest pain, shortness of breath, wheezing, or difficulty breathing    Coughing up blood    Inability to swallow due to throat pain  Date Last Reviewed: 9/13/2015 2000-2017 [employer]. [address] [place] [address]. All rights reserved. This information is not intended as a substitute for professional medical care. Always follow your healthcare professional's instructions.                Follow-ups after your visit        Who to contact     If you have questions or need follow up information about today's clinic visit or your schedule please contact [facility] directly at [phone].  Normal or non-critical lab and imaging results will be communicated to you by [employer], letter or phone within 4 business days after the clinic has received the results. If you do not hear from us within 7 days, please contact the clinic through [employer] or phone. If you have a critical or abnormal lab result, we will notify you by phone as soon as possible.  Submit refill requests through [place] [facility] or call your pharmacy and they will forward the refill request to us. Please allow 3 business days for your refill to be completed.          Additional Information About Your Visit        [place] Information     [facility] gives you secure access to your electronic  health record. If you see a primary care provider, you can also send messages to your care team and make appointments. If you have questions, please call your primary care clinic.  If you do not have a primary care provider, please call [phone] and they will assist you.        Care EveryWhere ID     This is your Care EveryWhere ID. This could be used by other organizations to access your [place] medical records  [id number]        Your Vitals Were     Pulse Temperature Pulse Oximetry             64 99.1  F (37.3  C) (Tympanic) 96%          Blood Pressure from Last 3 Encounters:   09/05/17 116/68   06/22/17 119/64   06/19/17 131/87    Weight from Last 3 Encounters:   06/22/17 179 lb (81.2 kg)   06/19/17 182 lb 11.2 oz (82.9 kg)   06/06/17 177 lb 8 oz (80.5 kg)              Today, you had the following     No orders found for display       Primary Care Provider Office Phone # Fax #    Rosario Kang [phone] [address] [id number]        Equal Access to Services     [facility] Center[facility]ANNY : Hadii inés ku haden Sochrissy, waaxda luelle, qaybta kaalannika mercado, smooth ortega . So [facility] [phone].    ATENCIÓN: Si habla español, tiene a samayoa disposición servicios gratuitos de asistencia lingüística. [facility] [phone].    We comply with applicable federal civil rights laws and [place] laws. We do not discriminate on the basis of race, color, national origin, age, disability sex, sexual orientation or gender identity.            Thank you!     Thank you for choosing [facility] URGENT CARE  for your care. Our goal is always to provide you with excellent care. Hearing back from our patients is one way we can continue to improve our services. Please take a few minutes to complete the written survey that you may receive in the mail after your visit with us. Thank you!             Your Updated Medication List - Protect others around you: Learn how to  safely use, store and throw away your medicines at www.disposemymeds.org.          This list is accurate as of: 9/5/17  2:43 PM.  Always use your most recent med list.                   Brand Name Dispense Instructions for use Diagnosis    cholecalciferol 1000 UNIT tablet    vitamin D     Take by mouth daily        estradiol 0.075 MG/24HR BIW patch    VIVELLE-DOT    12 patch    Place 1 patch onto the skin twice a week    Surgical menopause on hormone replacement therapy, History of endometriosis       letrozole 2.5 MG tablet    FEMARA    90 tablet    Take 1 tablet (2.5 mg) by mouth daily    Endometriosis       polyethylene glycol Packet    MIRALAX/GLYCOLAX     Take 1 packet by mouth 2 times daily        progesterone 100 MG capsule    PROMETRIUM    90 capsule    Take 1 capsule (100 mg) by mouth daily    Surgical menopause on hormone replacement therapy, History of endometriosis

## 2017-09-05 NOTE — NURSING NOTE
"Chief Complaint   Patient presents with     Urgent Care     URI     chest cold with coughing x6 days tx:dayquil, nightquil       Initial /68 (BP Location: Right arm, Patient Position: Chair, Cuff Size: Adult Regular)  Pulse 64  Temp 99.1  F (37.3  C) (Tympanic)  SpO2 96% Estimated body mass index is 26.63 kg/(m^2) as calculated from the following:    Height as of 6/22/17: 5' 8.75\" (1.746 m).    Weight as of 6/22/17: 179 lb (81.2 kg).  Medication Reconciliation: unable or not appropriate to perform   Niurka Domingo Medical Assistant      "

## 2017-09-19 ENCOUNTER — OFFICE VISIT (OUTPATIENT)
Dept: FAMILY MEDICINE | Facility: CLINIC | Age: 37
End: 2017-09-19
Payer: COMMERCIAL

## 2017-09-19 VITALS
BODY MASS INDEX: 26.92 KG/M2 | DIASTOLIC BLOOD PRESSURE: 71 MMHG | OXYGEN SATURATION: 98 % | SYSTOLIC BLOOD PRESSURE: 134 MMHG | TEMPERATURE: 98 F | WEIGHT: 181 LBS | HEART RATE: 65 BPM

## 2017-09-19 DIAGNOSIS — J34.89 RHINORRHEA: ICD-10-CM

## 2017-09-19 DIAGNOSIS — R05.9 COUGH: ICD-10-CM

## 2017-09-19 DIAGNOSIS — R09.81 NASAL CONGESTION: ICD-10-CM

## 2017-09-19 DIAGNOSIS — J01.90 ACUTE SINUSITIS WITH SYMPTOMS > 10 DAYS: Primary | ICD-10-CM

## 2017-09-19 PROCEDURE — 99214 OFFICE O/P EST MOD 30 MIN: CPT | Performed by: FAMILY MEDICINE

## 2017-09-19 RX ORDER — AZITHROMYCIN 250 MG/1
TABLET, FILM COATED ORAL
Qty: 6 TABLET | Refills: 0 | Status: SHIPPED | OUTPATIENT
Start: 2017-09-19

## 2017-09-19 NOTE — PATIENT INSTRUCTIONS
zpak  In case bacterial since gong on 3 weeks  flonase 1 spray each nostril daily 2 weeks  Zyrtec 10 mg daily 2 week  mucinex 600 mg twice a day 1 week  supportive care as below  flu shot at work   continue care with gyn  continue follow up with dermatology  follow up colorectal as needed   Go to the Er if worse  Follow up with primary if symptoms persist     Your symptoms and exam today indicate that you have a viral upper respiratory illness.  This includes viral rhinosinusitis and viral bronchitis.  Antibiotics do not help viral illnesses; the best remedies treat the symptoms (see below).  The typical course of a viral illness is that you feel rather miserable for the first few days - with sore throat, runny nose/nasal congestion, cough, and sometimes fever and body aches.  You should start to feel better after about 5-7 days and much better by 10-14 days.  If you develop sudden worsening of symptoms or fever after the first 5-7 days, or if you have persistence of your symptoms beyond 14 days, let us know as you may have developed a secondary bacterial infection.      For symptom relief I suggest tryin. Steam.  Take a long, hot shower.  Or if you don't want to get in the shower just run it with the bathroom door shut for a few minutes and breathe the steam.  2. Drink hot liquids frequently such as tea or hot water with honey and lemon.  3. Acetaminophen (Tylenol) and ibuprofen (Motrin or Advil) as needed for headache, sore throat, body aches, or fever.  4. For loosening phlegm and sputum try guaifenesin (available in many combination products and alone as plain Robitussin or plain Mucinex) and for cough suppression you can try dextromethorphan (Delsym or combined in other products).  5. For nasal congestion try:    An oral decongestant.  The only decongestant I recommend is pseudoephedrine. Ask the pharmacist for the over the counter (but real) pseudoephedrine - not phenylephrine.  This can raise your blood  pressure and heart rate so do not use this if you have hypertension.       Afrin spray for 3 days.  (Never use afrin nasal spray for more than 3 days as there is a risk of developing tolerance and rebound/worsening nasal congestion if used longer than this.)    Nealmed sinus rinses.    Nasal steroid spray such as nasacort or flonase, which are over-the-counter.  6. And most importantly: plenty of rest and sleep  especially while you have a fever.     Stop smoking and avoid secondhand smoke    Drink lots of fluids such as water and clear soups. Fluids help loosen mucus. Fluids are also important because they help prevent dehydration.    Gargle with warm salt water a few times a day to relieve a sore throat. Throat sprays or lozenges may also help relieve the pain.    Avoid alcohol.    Use saline (salt water) nose drops to help loosen mucus and moisten the tender skin in your nose.  Encouraged mucinex, warm salt water gargles, cepacol spray, soothers/lozenges, sinus rinses (neilmed), flonase (2 sprays per nostril daily x 2 weeks), vitamin c, fluids and rest.  May alternate tylenol and NSAIDS (ibuprofen, advil, aleve type products) every 4-6 hours for the next few days as needed.   No need for oral antibiotic at this time.

## 2017-09-19 NOTE — PROGRESS NOTES
SUBJECTIVE:   Ranjana Kovacs is a 37 year old female who presents to clinic today for the following health issues:      RESPIRATORY SYMPTOMS      Duration: x 3 weeks    Description  sore throat, cough, headache and fatigue/malaise    Severity: severe    Accompanying signs and symptoms: None    History (predisposing factors):  no    Therapies tried and outcome:  Day quill and night quill     Being doing ron pot    Seen 9/6/17 with 6 days hx of URI assessed to be viral.. Feeling better but still has copious post nasal drip initially very yellow now clearer, it makes her cough especially on lying down and makes her throat sore. Not so much runny nose , feels more stuffed up and congested in her cheek bones. Has a sinus headache and feels fatigued and today first day less fatigued but symptoms now about 3 weeks     Remains on hrt with letrozole and controlling her endometriosis well  Not done dexa yet  Follows with gyn  Seen by colorectal and hemorrhoids better now that she is taking metamucil / miralax daily     Will get her flu shot free at work.     Problem list and histories reviewed & adjusted, as indicated.  Additional history: as documented    Patient Active Problem List   Diagnosis     History of endometriosis s/p TAHBSO     Constipation, unspecified constipation type     Irritant contact dermatitis due to drug in contact with skin: generic estrogen patch     Vitamin D deficiency     Past Surgical History:   Procedure Laterality Date     ABDOMEN SURGERY       COLONOSCOPY  4/27/16    diverticulosis, polyp removed,      GENITOURINARY SURGERY       HYSTERECTOMY, PAP NO LONGER INDICATED  7/20/2012    total per records reviewed      HYSTEROSCOPY DIAGNOSTIC  2011       Social History   Substance Use Topics     Smoking status: Former Smoker     Packs/day: 0.50     Years: 2.00     Types: Cigarettes     Start date: 1/1/1996     Quit date: 1/1/1999     Smokeless tobacco: Never Used     Alcohol use 0.0 oz/week       Comment: occasional      Family History   Problem Relation Age of Onset     Medical History Unknown Mother      ? gastric issues not specified     Other - See Comments Mother      diverticulitis     Coronary Artery Disease Father      MI     Hypertension Father      Hyperlipidemia Father      Retinal detachment Father      Lactose Intolerance Brother      and father     Colon Cancer No family hx of      Glaucoma No family hx of      Macular Degeneration No family hx of          Current Outpatient Prescriptions   Medication Sig Dispense Refill     azithromycin (ZITHROMAX) 250 MG tablet Two tablets first day, then one tablet daily for four days. 6 tablet 0     cholecalciferol (VITAMIN D3) 1000 UNIT tablet Take by mouth daily       progesterone (PROMETRIUM) 100 MG capsule Take 1 capsule (100 mg) by mouth daily 90 capsule 3     letrozole (FEMARA) 2.5 MG tablet Take 1 tablet (2.5 mg) by mouth daily 90 tablet 3     estradiol (VIVELLE-DOT) 0.075 MG/24HR BIW patch Place 1 patch onto the skin twice a week 12 patch 3     polyethylene glycol (MIRALAX/GLYCOLAX) packet Take 1 packet by mouth 2 times daily       Allergies   Allergen Reactions     Penicillins      Very high fevers     Recent Labs   Lab Test  06/06/17   1720  03/01/16   1110   LDL  132*   --    HDL  60   --    TRIG  107   --    ALT  28  30   CR  0.81  0.73   GFRESTIMATED  80  >90  Non  GFR Calc     GFRESTBLACK  >90   GFR Calc    >90   GFR Calc     POTASSIUM  4.0  3.9   TSH  0.87  0.87      BP Readings from Last 3 Encounters:   09/19/17 134/71   09/05/17 116/68   06/22/17 119/64    Wt Readings from Last 3 Encounters:   09/19/17 181 lb (82.1 kg)   06/22/17 179 lb (81.2 kg)   06/19/17 182 lb 11.2 oz (82.9 kg)                  Labs reviewed in EPIC          Reviewed and updated as needed this visit by clinical staff     Reviewed and updated as needed this visit by Provider         ROS:  Constitutional, HEENT,  cardiovascular, pulmonary, GI, , musculoskeletal, neuro, skin, endocrine and psych systems are negative, except as otherwise noted.      OBJECTIVE:   /71  Pulse 65  Temp 98  F (36.7  C) (Oral)  Wt 181 lb (82.1 kg)  SpO2 98%  BMI 26.92 kg/m2  Body mass index is 26.92 kg/(m^2).  GENERAL: healthy, alert and no distress  EYES: Eyes grossly normal to inspection, PERRL and conjunctivae and sclerae normal  HENT: normal cephalic/atraumatic, both ears: clear effusion, nose and mouth without ulcers or lesions, nasal mucosa edematous , rhinorrhea white and mucoid, oropharynx clear, oral mucous membranes moist, sinuses: not tender and copious post pharyngeal drainage noted   NECK: no adenopathy, no asymmetry, masses, or scars and thyroid normal to palpation  RESP: lungs clear to auscultation - no rales, rhonchi or wheezes  CV: regular rate and rhythm, normal S1 S2, no S3 or S4, no murmur, click or rub, no peripheral edema and peripheral pulses strong  ABDOMEN: soft, non tender, no hepatosplenomegaly, no masses and bowel sounds normal  MS: no gross musculoskeletal defects noted, no edema  SKIN: no suspicious lesions or rashes  NEURO: Normal strength and tone, mentation intact and speech normal  PSYCH: mentation appears normal, affect normal/bright    Diagnostic Test Results:  No results found for this or any previous visit (from the past 24 hour(s)).    ASSESSMENT/PLAN:   former smoker, Hx of endometriosis S/P ROCKY & BSO on HRT ( Vivelle dot )and on letrozole since 2/2017 since seen Gyn in [place] , vit d deficiency on vit d , IBS with constipation on miralax, bentyl and hyoscyamine prn in past seen by GI , irritant contact dermatitis from estrogen patch, seen by me in 3/2016 , seen by GI 5/11/16 given trial of dicyclomine and peppermint ( Ibgard). Seen by gyn 2/8/17 for endometriosis symptoms suspected HRt started too soon after surgery. After discussion continued on HRT with Prometrium and Vivelle with Femara  added. Seen 6/6 for physical. Cbc, CMP, lipids, TSH, vit d normal, continued vit d 09724 daily, referred to colorectal for external hemorrhoids, given flexeril for right trapzeius strain & referred to ortho for left foot pain. seen by colorectal , felt rectal symptoms from chronic constipation & seepage of fecal matter advised fiber no indication for banding see and advised to follow P when had acute symptoms. Seen by gyn 6/22 continued on HRT with letrozole and dexa ordered not done yet. Seen 9/6/17 with 6 days hx of URI assessed to be viral. [place]  review shows received oxycodone 5 mg #1 5 by DDS .here for cold of 3 weeks    1. Acute sinusitis with symptoms > 10 days  Suspect viral uri with post infectious post nasal drainage but symptoms now 3 weeks with head congestion,fatigue and not getting better with symptomatic measures. zpak  In case bacterial since gong on 3 weeks. Flonase 1 spray each nostril daily 2 weeks. Zyrtec 10 mg daily 2 week. mucinex 600 mg twice a day 1 week. supportive care as below. flu shot at work. continue care with gyn. ( get dexa scan when better )continue follow up with dermatology. follow up colorectal as needed. Go to the Er if worse. Follow up with primary if symptoms persist   - azithromycin (ZITHROMAX) 250 MG tablet; Two tablets first day, then one tablet daily for four days.  Dispense: 6 tablet; Refill: 0    2. Cough  Secondary to post nasal drip from sinus.   - azithromycin (ZITHROMAX) 250 MG tablet; Two tablets first day, then one tablet daily for four days.  Dispense: 6 tablet; Refill: 0    3. Nasal congestion  [place] pot, Flonase,symptomatic measures  - azithromycin (ZITHROMAX) 250 MG tablet; Two tablets first day, then one tablet daily for four days.  Dispense: 6 tablet; Refill: 0    4. Rhinorrhea  - azithromycin (ZITHROMAX) 250 MG tablet; Two tablets first day, then one tablet daily for four days.  Dispense: 6 tablet; Refill: 0    See Patient Instructions  Patient  Instructions   zpak  In case bacterial since gong on 3 weeks  flonase 1 spray each nostril daily 2 weeks  Zyrtec 10 mg daily 2 week  mucinex 600 mg twice a day 1 week  supportive care as below  flu shot at work   continue care with gyn  continue follow up with dermatology  follow up colorectal as needed   Go to the Er if worse  Follow up with primary if symptoms persist     Your symptoms and exam today indicate that you have a viral upper respiratory illness.  This includes viral rhinosinusitis and viral bronchitis.  Antibiotics do not help viral illnesses; the best remedies treat the symptoms (see below).  The typical course of a viral illness is that you feel rather miserable for the first few days - with sore throat, runny nose/nasal congestion, cough, and sometimes fever and body aches.  You should start to feel better after about 5-7 days and much better by 10-14 days.  If you develop sudden worsening of symptoms or fever after the first 5-7 days, or if you have persistence of your symptoms beyond 14 days, let us know as you may have developed a secondary bacterial infection.      For symptom relief I suggest tryin. Steam.  Take a long, hot shower.  Or if you don't want to get in the shower just run it with the bathroom door shut for a few minutes and breathe the steam.  2. Drink hot liquids frequently such as tea or hot water with honey and lemon.  3. Acetaminophen (Tylenol) and ibuprofen (Motrin or Advil) as needed for headache, sore throat, body aches, or fever.  4. For loosening phlegm and sputum try guaifenesin (available in many combination products and alone as plain Robitussin or plain Mucinex) and for cough suppression you can try dextromethorphan (Delsym or combined in other products).  5. For nasal congestion try:    An oral decongestant.  The only decongestant I recommend is pseudoephedrine. Ask the pharmacist for the over the counter (but real) pseudoephedrine - not phenylephrine.  This can  raise your blood pressure and heart rate so do not use this if you have hypertension.       Afrin spray for 3 days.  (Never use afrin nasal spray for more than 3 days as there is a risk of developing tolerance and rebound/worsening nasal congestion if used longer than this.)    Nealmed sinus rinses.    Nasal steroid spray such as nasacort or flonase, which are over-the-counter.  6. And most importantly: plenty of rest and sleep  especially while you have a fever.     Stop smoking and avoid secondhand smoke    Drink lots of fluids such as water and clear soups. Fluids help loosen mucus. Fluids are also important because they help prevent dehydration.    Gargle with warm salt water a few times a day to relieve a sore throat. Throat sprays or lozenges may also help relieve the pain.    Avoid alcohol.    Use saline (salt water) nose drops to help loosen mucus and moisten the tender skin in your nose.  Encouraged mucinex, warm salt water gargles, cepacol spray, soothers/lozenges, sinus rinses (neilmed), flonase (2 sprays per nostril daily x 2 weeks), vitamin c, fluids and rest.  May alternate tylenol and NSAIDS (ibuprofen, advil, aleve type products) every 4-6 hours for the next few days as needed.   No need for oral antibiotic at this time.        Rosario Kang MD  [facility]

## 2017-09-19 NOTE — MR AVS SNAPSHOT
After Visit Summary   2017    Ranjana Kovacs    MRN: [id number]           Patient Information     Date Of Birth          1980        Visit Information        Provider Department      2017 4:20 PM Rosario Kang MD [facility]        Today's Diagnoses     Acute sinusitis with symptoms > 10 days    -  1    Cough        Nasal congestion        Rhinorrhea          Care Instructions    zpak  In case bacterial since gong on 3 weeks  flonase 1 spray each nostril daily 2 weeks  Zyrtec 10 mg daily 2 week  mucinex 600 mg twice a day 1 week  supportive care as below  flu shot at work   continue care with gyn  continue follow up with dermatology  follow up colorectal as needed   Go to the Er if worse  Follow up with primary if symptoms persist     Your symptoms and exam today indicate that you have a viral upper respiratory illness.  This includes viral rhinosinusitis and viral bronchitis.  Antibiotics do not help viral illnesses; the best remedies treat the symptoms (see below).  The typical course of a viral illness is that you feel rather miserable for the first few days - with sore throat, runny nose/nasal congestion, cough, and sometimes fever and body aches.  You should start to feel better after about 5-7 days and much better by 10-14 days.  If you develop sudden worsening of symptoms or fever after the first 5-7 days, or if you have persistence of your symptoms beyond 14 days, let us know as you may have developed a secondary bacterial infection.      For symptom relief I suggest tryin. Steam.  Take a long, hot shower.  Or if you don't want to get in the shower just run it with the bathroom door shut for a few minutes and breathe the steam.  2. Drink hot liquids frequently such as tea or hot water with honey and lemon.  3. Acetaminophen (Tylenol) and ibuprofen (Motrin or Advil) as needed for headache, sore throat, body aches, or fever.  4. For loosening phlegm and  sputum try guaifenesin (available in many combination products and alone as plain Robitussin or plain Mucinex) and for cough suppression you can try dextromethorphan (Delsym or combined in other products).  5. For nasal congestion try:    An oral decongestant.  The only decongestant I recommend is pseudoephedrine. Ask the pharmacist for the over the counter (but real) pseudoephedrine - not phenylephrine.  This can raise your blood pressure and heart rate so do not use this if you have hypertension.       Afrin spray for 3 days.  (Never use afrin nasal spray for more than 3 days as there is a risk of developing tolerance and rebound/worsening nasal congestion if used longer than this.)    Nealmed sinus rinses.    Nasal steroid spray such as nasacort or flonase, which are over-the-counter.  6. And most importantly: plenty of rest and sleep  especially while you have a fever.     Stop smoking and avoid secondhand smoke    Drink lots of fluids such as water and clear soups. Fluids help loosen mucus. Fluids are also important because they help prevent dehydration.    Gargle with warm salt water a few times a day to relieve a sore throat. Throat sprays or lozenges may also help relieve the pain.    Avoid alcohol.    Use saline (salt water) nose drops to help loosen mucus and moisten the tender skin in your nose.  Encouraged mucinex, warm salt water gargles, cepacol spray, soothers/lozenges, sinus rinses (neilmed), flonase (2 sprays per nostril daily x 2 weeks), vitamin c, fluids and rest.  May alternate tylenol and NSAIDS (ibuprofen, advil, aleve type products) every 4-6 hours for the next few days as needed.   No need for oral antibiotic at this time.            Follow-ups after your visit        Who to contact     If you have questions or need follow up information about today's clinic visit or your schedule please contact [facility] directly at [phone].  Normal or non-critical lab and imaging  results will be communicated to you by [employer], letter or phone within 4 business days after the clinic has received the results. If you do not hear from us within 7 days, please contact the clinic through [employer] or phone. If you have a critical or abnormal lab result, we will notify you by phone as soon as possible.  Submit refill requests through [employer] or call your pharmacy and they will forward the refill request to us. Please allow 3 business days for your refill to be completed.          Additional Information About Your Visit        [employer] Information     [employer] gives you secure access to your electronic health record. If you see a primary care provider, you can also send messages to your care team and make appointments. If you have questions, please call your primary care clinic.  If you do not have a primary care provider, please call [phone] and they will assist you.        Care EveryWhere ID     This is your Care EveryWhere ID. This could be used by other organizations to access your [place] medical records  [id number]        Your Vitals Were     Pulse Temperature Pulse Oximetry BMI (Body Mass Index)          65 98  F (36.7  C) (Oral) 98% 26.92 kg/m2         Blood Pressure from Last 3 Encounters:   09/19/17 134/71   09/05/17 116/68   06/22/17 119/64    Weight from Last 3 Encounters:   09/19/17 181 lb (82.1 kg)   06/22/17 179 lb (81.2 kg)   06/19/17 182 lb 11.2 oz (82.9 kg)              Today, you had the following     No orders found for display         Today's Medication Changes          These changes are accurate as of: 9/19/17  4:47 PM.  If you have any questions, ask your nurse or doctor.               Start taking these medicines.        Dose/Directions    azithromycin 250 MG tablet   Commonly known as:  ZITHROMAX   Used for:  Acute sinusitis with symptoms > 10 days, Cough, Nasal congestion, Rhinorrhea   Started by:  Rosario Kang MD        Two tablets first day, then one tablet daily for  four days.   Quantity:  6 tablet   Refills:  0            Where to get your medicines      These medications were sent to [facility] [address] [id number] [facility] AT [address] [address]     Phone:  [phone]     azithromycin 250 MG tablet                Primary Care Provider Office Phone # Fax #    Rosario Kang [phone] [address]  [place] [address]        Equal Access to Services     CUAUHTEMOC JOHNSON : Hadii aad ku hadasho Soomaali, waaxda luqadaha, qaybta kaalmada adeegyada, waxay idiin hayaan adeeg alicia ortega . So [facility] [phone].    ATENCIÓN: Si habla español, tiene a samayoa disposición servicios gratuitos de asistencia lingüística. Llame al [phone].    We comply with applicable federal civil rights laws and [place] laws. We do not discriminate on the basis of race, color, national origin, age, disability sex, sexual orientation or gender identity.            Thank you!     Thank you for choosing [facility]  for your care. Our goal is always to provide you with excellent care. Hearing back from our patients is one way we can continue to improve our services. Please take a few minutes to complete the written survey that you may receive in the mail after your visit with us. Thank you!             Your Updated Medication List - Protect others around you: Learn how to safely use, store and throw away your medicines at www.disposemymeds.org.          This list is accurate as of: 9/19/17  4:47 PM.  Always use your most recent med list.                   Brand Name Dispense Instructions for use Diagnosis    azithromycin 250 MG tablet    ZITHROMAX    6 tablet    Two tablets first day, then one tablet daily for four days.    Acute sinusitis with symptoms > 10 days, Cough, Nasal congestion, Rhinorrhea       cholecalciferol 1000 UNIT tablet    vitamin D     Take by mouth daily        estradiol 0.075 MG/24HR  BIW patch    VIVELLE-DOT    12 patch    Place 1 patch onto the skin twice a week    Surgical menopause on hormone replacement therapy, History of endometriosis       letrozole 2.5 MG tablet    FEMARA    90 tablet    Take 1 tablet (2.5 mg) by mouth daily    Endometriosis       polyethylene glycol Packet    MIRALAX/GLYCOLAX     Take 1 packet by mouth 2 times daily        progesterone 100 MG capsule    PROMETRIUM    90 capsule    Take 1 capsule (100 mg) by mouth daily    Surgical menopause on hormone replacement therapy, History of endometriosis

## 2017-09-19 NOTE — NURSING NOTE
"Chief Complaint   Patient presents with     URI       Initial /71  Pulse 65  Temp 98  F (36.7  C) (Oral)  Wt 181 lb (82.1 kg)  SpO2 98%  BMI 26.92 kg/m2 Estimated body mass index is 26.92 kg/(m^2) as calculated from the following:    Height as of 6/22/17: 5' 8.75\" (1.746 m).    Weight as of this encounter: 181 lb (82.1 kg).  Medication Reconciliation: complete   Nikki Garcia MA      "

## 2017-10-06 ENCOUNTER — OFFICE VISIT (OUTPATIENT)
Dept: OPHTHALMOLOGY | Facility: CLINIC | Age: 37
End: 2017-10-06

## 2017-10-06 DIAGNOSIS — H52.13 MYOPIA OF BOTH EYES: Primary | ICD-10-CM

## 2017-10-06 ASSESSMENT — VISUAL ACUITY
CORRECTION_TYPE: CONTACTS
METHOD: SNELLEN - LINEAR
OD_CC: 20/30
OS_CC: 20/40

## 2017-10-06 ASSESSMENT — REFRACTION_CURRENTRX
OS_DIAMETER: 14.5
OS_SPHERE: -6.00
OS_BRAND: 1-DAY ACUVUE MOIST FOR ASTIGMATISM
OS_BASECURVE: 8.5
OD_DIAMETER: 14.5
OD_SPHERE: -8.00
OS_AXIS: 010
OD_AXIS: 010
OD_BRAND: 1-DAY ACUVUE MOIST FOR ASTIGMATISM
OD_BASECURVE: 8.5
OD_CYLINDER: -0.75
OS_CYLINDER: -1.75

## 2017-10-06 NOTE — PROGRESS NOTES
Assessment/Plan  (H52.13) Myopia of both eyes  (primary encounter diagnosis)  Comment: Myopic astigmatism OU  Plan:  Dispensed finalized contact lens prescription for 2 years. Reutnr to clinic in 1 year for comprehensive eye exam.    Contact Lens Billing  V-Code:  - Soft toric  Final Contact Lens Rx      Brand Base Curve Diameter Sphere Cylinder Axis   Right 1-Day Acuvue Moist for Astigmatism 8.5 14.5 -8.50 -0.75 010   Left 1-Day Acuvue Moist for Astigmatism 8.5 14.5 -6.50 -1.75 010       Expiration Date:  10/7/2019    Replacement:  Daily    Wearing Schedule:  Daily wear         These are for cosmetic contact lenses.    Encounter Diagnosis   Name Primary?     Myopia of both eyes Yes        Date of last eye exam: 8/16/17    This visit billed as no-charge contact lens follow-up.      Complete documentation of historical and exam elements from today's encounter can  be found in the full encounter summary report (not reduplicated in this progress  note). I personally obtained the chief complaint(s) and history of present illness. I  confirmed and edited as necessary the review of systems, past medical/surgical  history, family history, social history, and examination findings as documented by  others; and I examined the patient myself. I personally reviewed the relevant tests,  images, and reports as documented above. I formulated and edited as necessary the  assessment and plan and discussed the findings and management plan with the  patient and family.    Shyam Jennings OD, FAAO

## 2017-10-06 NOTE — MR AVS SNAPSHOT
After Visit Summary   10/6/2017    Ranjana Kovacs    MRN: [id number]           Patient Information     Date Of Birth          1980        Visit Information        Provider Department      10/6/2017 8:00 AM Shyam Jennings OD M [facility] Ophthalmology        Today's Diagnoses     Myopia of both eyes    -  1       Follow-ups after your visit        Follow-up notes from your care team     Return in about 1 year (around 10/6/2018) for Comprehensive Eye Exam.      Who to contact     Please call your clinic at [phone] to:    Ask questions about your health    Make or cancel appointments    Discuss your medicines    Learn about your test results    Speak to your doctor   If you have compliments or concerns about an experience at your clinic, or if you wish to file a complaint, please contact [facility] Patient Relations at [phone] or email us at [email or web address]         Additional Information About Your Visit        MyChart Information     [employer] gives you secure access to your electronic health record. If you see a primary care provider, you can also send messages to your care team and make appointments. If you have questions, please call your primary care clinic.  If you do not have a primary care provider, please call [phone] and they will assist you.      [email or web address] is an electronic gateway that provides easy, online access to your medical records. With [email or web address], you can request a clinic appointment, read your test results, renew a prescription or communicate with your care team.     To access your existing account, please contact your [facility] or call [phone] for assistance.        Care EveryWhere ID     This is your Care EveryWhere ID. This could be used by other organizations to access your [place] medical records  [id number]         Blood Pressure from Last 3 Encounters:   09/19/17 134/71   09/05/17  116/68   06/22/17 119/64    Weight from Last 3 Encounters:   09/19/17 82.1 kg (181 lb)   06/22/17 81.2 kg (179 lb)   06/19/17 82.9 kg (182 lb 11.2 oz)              Today, you had the following     No orders found for display       Primary Care Provider Office Phone # Fax #    Rosario Kang [phone] [address]  [facility] [address]        Equal Access to Services     CUAUHTEMOC JOHNSON : Hadii aad ku hadasho Soomaali, waaxda luqadaha, qaybta kaalmada adeegyada, waxay idiin hayaan adeeg alicia ortega . So [facility] [phone].    ATENCIÓN: Si habla español, tiene a samayoa disposición servicios gratuitos de asistencia lingüística. [facility] [phone].    We comply with applicable federal civil rights laws and [place] laws. We do not discriminate on the basis of race, color, national origin, age, disability, sex, sexual orientation, or gender identity.            Thank you!     Thank you for choosing [facility] OPHTHALMOLOG[facility]  for your care. Our goal is always to provide you with excellent care. Hearing back from our patients is one way we can continue to improve our services. Please take a few minutes to complete the written survey that you may receive in the mail after your visit with us. Thank you!             Your Updated Medication List - Protect others around you: Learn how to safely use, store and throw away your medicines at www.disposemymeds.org.          This list is accurate as of: 10/6/17  8:43 AM.  Always use your most recent med list.                   Brand Name Dispense Instructions for use Diagnosis    azithromycin 250 MG tablet    ZITHROMAX    6 tablet    Two tablets first day, then one tablet daily for four days.    Acute sinusitis with symptoms > 10 days, Cough, Nasal congestion, Rhinorrhea       cholecalciferol 1000 UNIT tablet    vitamin D     Take by mouth daily        estradiol 0.075 MG/24HR BIW patch    VIVELLE-DOT    12 patch    Place 1 patch onto the skin twice a week     Surgical menopause on hormone replacement therapy, History of endometriosis       letrozole 2.5 MG tablet    FEMARA    90 tablet    Take 1 tablet (2.5 mg) by mouth daily    Endometriosis       polyethylene glycol Packet    MIRALAX/GLYCOLAX     Take 1 packet by mouth 2 times daily        progesterone 100 MG capsule    PROMETRIUM    90 capsule    Take 1 capsule (100 mg) by mouth daily    Surgical menopause on hormone replacement therapy, History of endometriosis

## 2017-10-17 DIAGNOSIS — Z87.42 HISTORY OF ENDOMETRIOSIS: ICD-10-CM

## 2017-10-17 DIAGNOSIS — E89.40 SURGICAL MENOPAUSE ON HORMONE REPLACEMENT THERAPY: ICD-10-CM

## 2017-10-17 DIAGNOSIS — Z79.890 SURGICAL MENOPAUSE ON HORMONE REPLACEMENT THERAPY: ICD-10-CM

## 2017-10-17 RX ORDER — ESTRADIOL 0.07 MG/D
1 FILM, EXTENDED RELEASE TRANSDERMAL
Qty: 24 PATCH | Refills: 3 | Status: SHIPPED | OUTPATIENT
Start: 2017-10-19 | End: 2018-01-18

## 2017-10-17 NOTE — TELEPHONE ENCOUNTER
Received refill request for hemant partida.  Last in clinic 6/2017 for f/u endometriosis. Has enough Prometrium and letrozole to cover until due back in clinc

## 2018-01-17 ENCOUNTER — MYC MEDICAL ADVICE (OUTPATIENT)
Dept: OBGYN | Facility: CLINIC | Age: 38
End: 2018-01-17

## 2018-01-17 DIAGNOSIS — Z87.42 HISTORY OF ENDOMETRIOSIS: ICD-10-CM

## 2018-01-17 DIAGNOSIS — Z79.890 SURGICAL MENOPAUSE ON HORMONE REPLACEMENT THERAPY: ICD-10-CM

## 2018-01-17 DIAGNOSIS — E89.40 SURGICAL MENOPAUSE ON HORMONE REPLACEMENT THERAPY: ICD-10-CM

## 2018-01-18 RX ORDER — ESTRADIOL 0.07 MG/D
1 FILM, EXTENDED RELEASE TRANSDERMAL
Qty: 24 PATCH | Refills: 3 | Status: SHIPPED | OUTPATIENT
Start: 2018-01-18 | End: 2018-06-25

## 2018-01-18 NOTE — TELEPHONE ENCOUNTER
Patient has new pharmacy benefits and need brand name only estradiol patches. Nurse re-sent patches to new pharmacy and will await to see if pharmacy benefits rejects the request. Will attempt PA if we hear back from pharmacy.

## 2018-02-13 DIAGNOSIS — N80.9 ENDOMETRIOSIS: ICD-10-CM

## 2018-02-13 RX ORDER — LETROZOLE 2.5 MG/1
2.5 TABLET, FILM COATED ORAL DAILY
Qty: 90 TABLET | Refills: 1 | Status: SHIPPED | OUTPATIENT
Start: 2018-02-13 | End: 2018-04-25

## 2018-02-13 NOTE — TELEPHONE ENCOUNTER
Received refill request for letrozole.  Last in clinic 6/2017 so will send 6 month supply to new pharmacy to cover until due back in clinic.

## 2018-04-25 ENCOUNTER — OFFICE VISIT (OUTPATIENT)
Dept: OBGYN | Facility: CLINIC | Age: 38
End: 2018-04-25
Attending: OBSTETRICS & GYNECOLOGY
Payer: COMMERCIAL

## 2018-04-25 VITALS
DIASTOLIC BLOOD PRESSURE: 81 MMHG | HEIGHT: 69 IN | SYSTOLIC BLOOD PRESSURE: 138 MMHG | HEART RATE: 84 BPM | WEIGHT: 187.6 LBS | BODY MASS INDEX: 27.78 KG/M2

## 2018-04-25 DIAGNOSIS — Z79.890 SURGICAL MENOPAUSE ON HORMONE REPLACEMENT THERAPY: ICD-10-CM

## 2018-04-25 DIAGNOSIS — N80.9 ENDOMETRIOSIS: ICD-10-CM

## 2018-04-25 DIAGNOSIS — E89.40 SURGICAL MENOPAUSE ON HORMONE REPLACEMENT THERAPY: ICD-10-CM

## 2018-04-25 DIAGNOSIS — Z87.42 HISTORY OF ENDOMETRIOSIS: ICD-10-CM

## 2018-04-25 RX ORDER — LETROZOLE 2.5 MG/1
2.5 TABLET, FILM COATED ORAL DAILY
Qty: 270 TABLET | Refills: 0 | Status: SHIPPED | OUTPATIENT
Start: 2018-04-25 | End: 2018-06-25

## 2018-04-25 NOTE — LETTER
2018       RE: Ranjana Kovacs  [address]  [place] [address]     Dear Colleague,    Thank you for referring your patient, Ranjana Kovacs, to the [facility] at [facility]. Please see a copy of my visit note below.    SUBJECTIVE:  Rajnana Kovacs is a 37 yo female with a h/o endometriosis who is s/p ROCKY BSO back in  and presents to discuss her medications. She is on estradiol patch 0.075, prometrium 100mg, and  Letrozole 2.5mg daily. [place] her  got a new job. They plan on staying there for 5-10 years. She is wondering how to manage her medications while she is there. She reports good control of her endometriosis pain and only has occasional mild cramping.     Patient Active Problem List   Diagnosis     History of endometriosis s/p TAHBSO     Constipation, unspecified constipation type     Irritant contact dermatitis due to drug in contact with skin: generic estrogen patch     Vitamin D deficiency     Past Medical History:   Diagnosis Date     Anemia     long history of anemia, often take iron     Constipation      Endometriosis     s/p ROCKY/ BSO      History of anemia     on iron in the past      History of high cholesterol     was on lipitor controlled on diet no longer on statin, tested in [facility]  wnl      Uncomplicated asthma     inflamatory asthma     Past Surgical History:   Procedure Laterality Date     ABDOMEN SURGERY       COLONOSCOPY  16    diverticulosis, polyp removed,      GENITOURINARY SURGERY       HYSTERECTOMY, PAP NO LONGER INDICATED  2012    total per records reviewed      HYSTEROSCOPY DIAGNOSTIC       Obstetric History       T0      L0     SAB0   TAB0   Ectopic0   Multiple0   Live Births0         Family History   Problem Relation Age of Onset     Medical History Unknown Mother      ? gastric issues not  "specified     Other - See Comments Mother      diverticulitis     Coronary Artery Disease Father      MI     Hypertension Father      Hyperlipidemia Father      Retinal detachment Father      Lactose Intolerance Brother      and father     Colon Cancer No family hx of      Glaucoma No family hx of      Macular Degeneration No family hx of      Social History     Social History     Marital status: Single     Spouse name: N/A     Number of children: N/A     Years of education: N/A     Occupational History            [employer]     Social History Main Topics     Smoking status: Former Smoker     Packs/day: 0.50     Years: 2.00     Types: Cigarettes     Start date: 1/1/1996     Quit date: 1/1/1999     Smokeless tobacco: Never Used     Alcohol use 0.0 oz/week      Comment: occasional      Drug use: No     Sexual activity: Yes     Partners: Male     Birth control/ protection: None     Other Topics Concern      Service No     Blood Transfusions No     Caffeine Concern No     Occupational Exposure No     Hobby Hazards No     Sleep Concern No     Stress Concern No     Weight Concern No     Special Diet No     Back Care No     Exercise Yes     Bike Helmet Yes     Seat Belt Yes     Self-Exams Yes     Social History Narrative    2/8/17    Works at [place] [employer]    Naz Cummings                Lives with significant other , , works at [employer], moved form [facility] [facility] [place] via [place] in 2014. Nulliparous, no children or pets.          OBJECTIVE:  /81  Pulse 84  Ht 1.746 m (5' 8.75\")  Wt 85.1 kg (187 lb 9.6 oz)  BMI 27.91 kg/m2    Total visit time was 20 minutes with 20 minutes spent in counseling and coordination of care for HRT in setting of surgical menopause and endometriosis.      A/P  Surgical Menopause in setting of H/O Endometriosis    Discussed indications for Prometrium and Letrozole and that she could discontinue these if they are not " beneficial.  Continue E2 patch and she will get Rx for that in [facility].     Masoud Molina  MS3    The student acted as my scribe. I have seen, examined and counseled the patient. I have reviewed and edited the note.       Again, thank you for allowing me to participate in the care of your patient.      Sincerely,    Naz Cummings MD

## 2018-04-25 NOTE — PROGRESS NOTES
SUBJECTIVE:  Ranjana Kovacs is a 37 yo female with a h/o endometriosis who is s/p ROCKY BSO back in  and presents to discuss her medications. She is on estradiol patch 0.075, prometrium 100mg, and  Letrozole 2.5mg daily. She is moving to [place] at the end of  as her  got a new job. They plan on staying there for 5-10 years. She is wondering how to manage her medications while she is there. She reports good control of her endometriosis pain and only has occasional mild cramping.     Patient Active Problem List   Diagnosis     History of endometriosis s/p TAHBSO     Constipation, unspecified constipation type     Irritant contact dermatitis due to drug in contact with skin: generic estrogen patch     Vitamin D deficiency     Past Medical History:   Diagnosis Date     Anemia     long history of anemia, often take iron     Constipation      Endometriosis     s/p ROCKY/ BSO      History of anemia     on iron in the past      History of high cholesterol     was on lipitor controlled on diet no longer on statin, tested in [facility]  wnl      Uncomplicated asthma     inflamatory asthma     Past Surgical History:   Procedure Laterality Date     ABDOMEN SURGERY       COLONOSCOPY  16    diverticulosis, polyp removed,      GENITOURINARY SURGERY       HYSTERECTOMY, PAP NO LONGER INDICATED  2012    total per records reviewed      HYSTEROSCOPY DIAGNOSTIC       Obstetric History       T0      L0     SAB0   TAB0   Ectopic0   Multiple0   Live Births0         Family History   Problem Relation Age of Onset     Medical History Unknown Mother      ? gastric issues not specified     Other - See Comments Mother      diverticulitis     Coronary Artery Disease Father      MI     Hypertension Father      Hyperlipidemia Father      Retinal detachment Father      Lactose Intolerance Brother      and father     Colon Cancer No family hx of      Glaucoma No family hx of      Macular  "Degeneration No family hx of      Social History     Social History     Marital status: Single     Spouse name: N/A     Number of children: N/A     Years of education: N/A     Occupational History            [employer]     Social History Main Topics     Smoking status: Former Smoker     Packs/day: 0.50     Years: 2.00     Types: Cigarettes     Start date: 1/1/1996     Quit date: 1/1/1999     Smokeless tobacco: Never Used     Alcohol use 0.0 oz/week      Comment: occasional      Drug use: No     Sexual activity: Yes     Partners: Male     Birth control/ protection: None     Other Topics Concern      Service No     Blood Transfusions No     Caffeine Concern No     Occupational Exposure No     Hobby Hazards No     Sleep Concern No     Stress Concern No     Weight Concern No     Special Diet No     Back Care No     Exercise Yes     Bike Helmet Yes     Seat Belt Yes     Self-Exams Yes     Social History Narrative    2/8/17    Works at [place] [employer]    Naz Cummings                Lives with significant other , , works at [employer], moved form [facility] via [place] in 2014. Nulliparous, no children or pets.          OBJECTIVE:  /81  Pulse 84  Ht 1.746 m (5' 8.75\")  Wt 85.1 kg (187 lb 9.6 oz)  BMI 27.91 kg/m2    Total visit time was 20 minutes with 20 minutes spent in counseling and coordination of care for HRT in setting of surgical menopause and endometriosis.      A/P  Surgical Menopause in setting of H/O Endometriosis    Discussed indications for Prometrium and Letrozole and that she could discontinue these if they are not beneficial.  Continue E2 patch and she will get Rx for that in [facility].     Masoud Molina  MS3    The student acted as my scribe. I have seen, examined and counseled the patient. I have reviewed and edited the note.   Naz Cummings    "

## 2018-04-25 NOTE — MR AVS SNAPSHOT
"              After Visit Summary   4/25/2018    Ranjana Kovacs    MRN: [id number]           Patient Information     Date Of Birth          1980        Visit Information        Provider Department      4/25/2018 8:45 AM Naz Cummings MD [facility]        Today's Diagnoses     Endometriosis        Surgical menopause on hormone replacement therapy        History of endometriosis           Follow-ups after your visit        Who to contact     Please call your clinic at [phone] to:    Ask questions about your health    Make or cancel appointments    Discuss your medicines    Learn about your test results    Speak to your doctor            Additional Information About Your Visit        MyChart Information     [employer] gives you secure access to your electronic health record. If you see a primary care provider, you can also send messages to your care team and make appointments. If you have questions, please call your primary care clinic.  If you do not have a primary care provider, please call [phone] and they will assist you.      [employer] is an electronic gateway that provides easy, online access to your medical records. With [employer], you can request a clinic appointment, read your test results, renew a prescription or communicate with your care team.     To access your existing account, please contact your [facility] or call [phone] for assistance.        Care EveryWhere ID     This is your Care EveryWhere ID. This could be used by other organizations to access your [place] medical records  [id number]        Your Vitals Were     Pulse Height BMI (Body Mass Index)             84 1.746 m (5' 8.75\") 27.91 kg/m2          Blood Pressure from Last 3 Encounters:   04/25/18 138/81   09/19/17 134/71   09/05/17 116/68    Weight from Last 3 Encounters:   04/25/18 85.1 kg (187 lb 9.6 oz)   09/19/17 82.1 kg (181 lb)   06/22/17 81.2 kg (179 lb)    "           Today, you had the following     No orders found for display         Where to get your medicines      These medications were sent to [id number] - [place], [place] - [address]  [address] [facility] [address]     Phone:  [phone]     letrozole 2.5 MG tablet    progesterone 100 MG capsule          Primary Care Provider Office Phone # Fax #    Rosario Kang [phone]       [address]  [facility] [address]        Equal Access to Services     CUAUHTEMOC JOHNSON : Hadii aad ku hadasho Soomaali, waaxda luqadaha, qaybta kaalmada adeegyada, waxay idiin hayladariusn joanne ortega . So [facility] [phone].    ATENCIÓN: Si habla español, tiene a samayoa disposición servicios gratuitos de asistencia lingüística. Llame al [phone].    We comply with applicable federal civil rights laws and [place] laws. We do not discriminate on the basis of race, color, national origin, age, disability, sex, sexual orientation, or gender identity.            Thank you!     Thank you for choosing [facility]  for your care. Our goal is always to provide you with excellent care. Hearing back from our patients is one way we can continue to improve our services. Please take a few minutes to complete the written survey that you may receive in the mail after your visit with us. Thank you!             Your Updated Medication List - Protect others around you: Learn how to safely use, store and throw away your medicines at www.disposemymeds.org.          This list is accurate as of 4/25/18 11:59 PM.  Always use your most recent med list.                   Brand Name Dispense Instructions for use Diagnosis    azithromycin 250 MG tablet    ZITHROMAX    6 tablet    Two tablets first day, then one tablet daily for four days.    Acute sinusitis with symptoms > 10 days, Cough, Nasal congestion, Rhinorrhea       cholecalciferol 1000 UNIT tablet    vitamin D3     Take by mouth daily         estradiol 0.075 MG/24HR BIW patch    VIVELLE-DOT    24 patch    Place 1 patch onto the skin twice a week    Surgical menopause on hormone replacement therapy, History of endometriosis       letrozole 2.5 MG tablet    FEMARA    270 tablet    Take 1 tablet (2.5 mg) by mouth daily    Endometriosis       polyethylene glycol Packet    MIRALAX/GLYCOLAX     Take 1 packet by mouth 2 times daily        progesterone 100 MG capsule    PROMETRIUM    270 capsule    Take 1 capsule (100 mg) by mouth daily    Surgical menopause on hormone replacement therapy, History of endometriosis

## 2018-04-26 PROBLEM — L24.4 IRRITANT CONTACT DERMATITIS DUE TO DRUG IN CONTACT WITH SKIN: Status: RESOLVED | Noted: 2017-02-08 | Resolved: 2018-04-26

## 2018-05-08 ENCOUNTER — MYC MEDICAL ADVICE (OUTPATIENT)
Dept: FAMILY MEDICINE | Facility: CLINIC | Age: 38
End: 2018-05-08

## 2018-05-31 ENCOUNTER — TELEPHONE (OUTPATIENT)
Dept: SURGERY | Facility: CLINIC | Age: 38
End: 2018-05-31

## 2018-05-31 NOTE — TELEPHONE ENCOUNTER
Called pt to discuss need for an earlier appt. Per pt she has been having constant pain which varies in intensity from 3-6. States hemorrhoids are internal and sometimes pop to the outside. States she has been treating her hemorrhoids with suppositories and wipes. Has been taking Metamucil once daily and Tylenol or Ibuprofen when pain is too intense. Recommended pt discontinue suppositories as this could be aggravating the issue, increase fiber to twice daily, take warm tub baths, and take Tylenol and Ibuprofen on a scheduled base. Encouraged pt to check in next week if these measures were not improving her symptoms or any time before that if her pain is increasing and is not tolerable. Pt states understanding. Provided direct contact information. Sanjana SHIPLEY LPN

## 2018-06-25 ENCOUNTER — MYC MEDICAL ADVICE (OUTPATIENT)
Dept: OBGYN | Facility: CLINIC | Age: 38
End: 2018-06-25

## 2018-06-25 DIAGNOSIS — Z87.42 HISTORY OF ENDOMETRIOSIS: ICD-10-CM

## 2018-06-25 DIAGNOSIS — N80.9 ENDOMETRIOSIS: ICD-10-CM

## 2018-06-25 DIAGNOSIS — Z79.890 SURGICAL MENOPAUSE ON HORMONE REPLACEMENT THERAPY: ICD-10-CM

## 2018-06-25 DIAGNOSIS — E89.40 SURGICAL MENOPAUSE ON HORMONE REPLACEMENT THERAPY: ICD-10-CM

## 2018-06-25 RX ORDER — ESTRADIOL 0.07 MG/D
1 FILM, EXTENDED RELEASE TRANSDERMAL
Qty: 24 PATCH | Refills: 0 | Status: SHIPPED | OUTPATIENT
Start: 2018-06-25 | End: 2018-11-07

## 2018-06-25 RX ORDER — LETROZOLE 2.5 MG/1
2.5 TABLET, FILM COATED ORAL DAILY
Qty: 90 TABLET | Refills: 0 | Status: SHIPPED | OUTPATIENT
Start: 2018-06-25

## 2018-06-25 NOTE — TELEPHONE ENCOUNTER
Insurance company would not fill patient's prescriptions for one year. Asking for 90 day supply. Rx sent.

## 2018-11-07 ENCOUNTER — TRANSFERRED RECORDS (OUTPATIENT)
Dept: HEALTH INFORMATION MANAGEMENT | Facility: CLINIC | Age: 38
End: 2018-11-07

## 2018-11-07 DIAGNOSIS — Z87.42 HISTORY OF ENDOMETRIOSIS: ICD-10-CM

## 2018-11-07 DIAGNOSIS — E89.40 SURGICAL MENOPAUSE ON HORMONE REPLACEMENT THERAPY: ICD-10-CM

## 2018-11-07 DIAGNOSIS — N80.9 ENDOMETRIOSIS: ICD-10-CM

## 2018-11-07 DIAGNOSIS — Z79.890 SURGICAL MENOPAUSE ON HORMONE REPLACEMENT THERAPY: ICD-10-CM

## 2018-11-07 RX ORDER — ESTRADIOL 0.07 MG/D
1 FILM, EXTENDED RELEASE TRANSDERMAL
Qty: 24 PATCH | Refills: 0 | Status: SHIPPED | OUTPATIENT
Start: 2018-11-08

## 2018-11-07 NOTE — TELEPHONE ENCOUNTER
Received refill request from [facility] for pt estradiol and Prometrium. Plan in April was to continue these medications. Sending to pt preferred pharmacy

## 2020-03-10 ENCOUNTER — HEALTH MAINTENANCE LETTER (OUTPATIENT)
Age: 40
End: 2020-03-10

## 2020-12-27 ENCOUNTER — HEALTH MAINTENANCE LETTER (OUTPATIENT)
Age: 40
End: 2020-12-27

## 2021-04-24 ENCOUNTER — HEALTH MAINTENANCE LETTER (OUTPATIENT)
Age: 41
End: 2021-04-24

## 2021-10-04 ENCOUNTER — HEALTH MAINTENANCE LETTER (OUTPATIENT)
Age: 41
End: 2021-10-04

## 2022-05-15 ENCOUNTER — HEALTH MAINTENANCE LETTER (OUTPATIENT)
Age: 42
End: 2022-05-15

## 2022-09-11 ENCOUNTER — HEALTH MAINTENANCE LETTER (OUTPATIENT)
Age: 42
End: 2022-09-11

## 2023-06-03 ENCOUNTER — HEALTH MAINTENANCE LETTER (OUTPATIENT)
Age: 43
End: 2023-06-03

## 2024-02-24 ENCOUNTER — HEALTH MAINTENANCE LETTER (OUTPATIENT)
Age: 44
End: 2024-02-24